# Patient Record
Sex: FEMALE | Race: WHITE | NOT HISPANIC OR LATINO
[De-identification: names, ages, dates, MRNs, and addresses within clinical notes are randomized per-mention and may not be internally consistent; named-entity substitution may affect disease eponyms.]

---

## 2018-11-15 ENCOUNTER — APPOINTMENT (OUTPATIENT)
Dept: OTOLARYNGOLOGY | Facility: CLINIC | Age: 78
End: 2018-11-15
Payer: MEDICARE

## 2018-11-15 VITALS
HEART RATE: 89 BPM | BODY MASS INDEX: 32.39 KG/M2 | SYSTOLIC BLOOD PRESSURE: 151 MMHG | WEIGHT: 165 LBS | HEIGHT: 60 IN | DIASTOLIC BLOOD PRESSURE: 85 MMHG

## 2018-11-15 DIAGNOSIS — I10 ESSENTIAL (PRIMARY) HYPERTENSION: ICD-10-CM

## 2018-11-15 DIAGNOSIS — G50.1 ATYPICAL FACIAL PAIN: ICD-10-CM

## 2018-11-15 DIAGNOSIS — R09.82 POSTNASAL DRIP: ICD-10-CM

## 2018-11-15 DIAGNOSIS — Z78.9 OTHER SPECIFIED HEALTH STATUS: ICD-10-CM

## 2018-11-15 DIAGNOSIS — J32.2 CHRONIC ETHMOIDAL SINUSITIS: ICD-10-CM

## 2018-11-15 PROCEDURE — 31231 NASAL ENDOSCOPY DX: CPT

## 2018-11-15 PROCEDURE — 99213 OFFICE O/P EST LOW 20 MIN: CPT | Mod: 25

## 2018-11-15 RX ORDER — IBUPROFEN 200 MG/1
200 TABLET, COATED ORAL
Refills: 0 | Status: ACTIVE | COMMUNITY

## 2018-11-15 RX ORDER — ACETAMINOPHEN 500 MG/1
500 TABLET, COATED ORAL
Refills: 0 | Status: ACTIVE | COMMUNITY

## 2018-11-15 RX ORDER — ASPIRIN 81 MG
81 TABLET, DELAYED RELEASE (ENTERIC COATED) ORAL
Refills: 0 | Status: ACTIVE | COMMUNITY

## 2018-11-15 RX ORDER — AZELASTINE HYDROCHLORIDE 137 UG/1
0.1 SPRAY, METERED NASAL TWICE DAILY
Qty: 1 | Refills: 5 | Status: ACTIVE | COMMUNITY
Start: 2018-11-15 | End: 1900-01-01

## 2018-11-15 RX ORDER — PREDNISONE 10 MG/1
10 TABLET ORAL TWICE DAILY
Qty: 20 | Refills: 2 | Status: ACTIVE | COMMUNITY
Start: 2018-11-15 | End: 1900-01-01

## 2018-11-15 RX ORDER — ATORVASTATIN CALCIUM 10 MG/1
10 TABLET, FILM COATED ORAL
Refills: 0 | Status: ACTIVE | COMMUNITY

## 2018-11-15 RX ORDER — LOSARTAN POTASSIUM 50 MG/1
50 TABLET, FILM COATED ORAL
Refills: 0 | Status: ACTIVE | COMMUNITY

## 2018-11-15 RX ORDER — ATENOLOL 50 MG/1
50 TABLET ORAL
Refills: 0 | Status: ACTIVE | COMMUNITY

## 2019-02-19 ENCOUNTER — OUTPATIENT (OUTPATIENT)
Dept: OUTPATIENT SERVICES | Facility: HOSPITAL | Age: 79
LOS: 1 days | Discharge: HOME | End: 2019-02-19
Payer: MEDICARE

## 2019-02-19 VITALS
TEMPERATURE: 98 F | DIASTOLIC BLOOD PRESSURE: 85 MMHG | WEIGHT: 173.06 LBS | RESPIRATION RATE: 20 BRPM | SYSTOLIC BLOOD PRESSURE: 120 MMHG | HEIGHT: 60 IN | OXYGEN SATURATION: 99 % | HEART RATE: 81 BPM

## 2019-02-19 DIAGNOSIS — M19.011 PRIMARY OSTEOARTHRITIS, RIGHT SHOULDER: ICD-10-CM

## 2019-02-19 DIAGNOSIS — Z96.659 PRESENCE OF UNSPECIFIED ARTIFICIAL KNEE JOINT: Chronic | ICD-10-CM

## 2019-02-19 DIAGNOSIS — Z98.890 OTHER SPECIFIED POSTPROCEDURAL STATES: Chronic | ICD-10-CM

## 2019-02-19 DIAGNOSIS — Z98.49 CATARACT EXTRACTION STATUS, UNSPECIFIED EYE: Chronic | ICD-10-CM

## 2019-02-19 DIAGNOSIS — Z90.49 ACQUIRED ABSENCE OF OTHER SPECIFIED PARTS OF DIGESTIVE TRACT: Chronic | ICD-10-CM

## 2019-02-19 DIAGNOSIS — Z96.651 PRESENCE OF RIGHT ARTIFICIAL KNEE JOINT: Chronic | ICD-10-CM

## 2019-02-19 DIAGNOSIS — Z29.9 ENCOUNTER FOR PROPHYLACTIC MEASURES, UNSPECIFIED: ICD-10-CM

## 2019-02-19 LAB
ANION GAP SERPL CALC-SCNC: 10 MMOL/L — SIGNIFICANT CHANGE UP (ref 5–17)
APPEARANCE UR: CLEAR — SIGNIFICANT CHANGE UP
BACTERIA # UR AUTO: ABNORMAL
BASOPHILS # BLD AUTO: 0.02 K/UL — SIGNIFICANT CHANGE UP (ref 0–0.2)
BASOPHILS NFR BLD AUTO: 0.2 % — SIGNIFICANT CHANGE UP (ref 0–2)
BILIRUB UR-MCNC: NEGATIVE — SIGNIFICANT CHANGE UP
BLD GP AB SCN SERPL QL: SIGNIFICANT CHANGE UP
BUN SERPL-MCNC: 34 MG/DL — HIGH (ref 7–23)
CALCIUM SERPL-MCNC: 8.9 MG/DL — SIGNIFICANT CHANGE UP (ref 8.5–10.1)
CHLORIDE SERPL-SCNC: 111 MMOL/L — HIGH (ref 96–108)
CO2 SERPL-SCNC: 23 MMOL/L — SIGNIFICANT CHANGE UP (ref 22–31)
COLOR SPEC: YELLOW — SIGNIFICANT CHANGE UP
CREAT SERPL-MCNC: 0.73 MG/DL — SIGNIFICANT CHANGE UP (ref 0.5–1.3)
DIFF PNL FLD: NEGATIVE — SIGNIFICANT CHANGE UP
EOSINOPHIL # BLD AUTO: 0.17 K/UL — SIGNIFICANT CHANGE UP (ref 0–0.5)
EOSINOPHIL NFR BLD AUTO: 1.5 % — SIGNIFICANT CHANGE UP (ref 0–6)
EPI CELLS # UR: SIGNIFICANT CHANGE UP
GLUCOSE SERPL-MCNC: 106 MG/DL — HIGH (ref 70–99)
GLUCOSE UR QL: NEGATIVE MG/DL — SIGNIFICANT CHANGE UP
HCT VFR BLD CALC: 36.6 % — SIGNIFICANT CHANGE UP (ref 34.5–45)
HGB BLD-MCNC: 11.1 G/DL — LOW (ref 11.5–15.5)
HYALINE CASTS # UR AUTO: ABNORMAL /LPF
IMM GRANULOCYTES NFR BLD AUTO: 0.4 % — SIGNIFICANT CHANGE UP (ref 0–1.5)
KETONES UR-MCNC: NEGATIVE — SIGNIFICANT CHANGE UP
LEUKOCYTE ESTERASE UR-ACNC: ABNORMAL
LYMPHOCYTES # BLD AUTO: 2.62 K/UL — SIGNIFICANT CHANGE UP (ref 1–3.3)
LYMPHOCYTES # BLD AUTO: 23.4 % — SIGNIFICANT CHANGE UP (ref 13–44)
MCHC RBC-ENTMCNC: 22.7 PG — LOW (ref 27–34)
MCHC RBC-ENTMCNC: 30.3 GM/DL — LOW (ref 32–36)
MCV RBC AUTO: 75 FL — LOW (ref 80–100)
MONOCYTES # BLD AUTO: 0.71 K/UL — SIGNIFICANT CHANGE UP (ref 0–0.9)
MONOCYTES NFR BLD AUTO: 6.3 % — SIGNIFICANT CHANGE UP (ref 2–14)
NEUTROPHILS # BLD AUTO: 7.66 K/UL — HIGH (ref 1.8–7.4)
NEUTROPHILS NFR BLD AUTO: 68.2 % — SIGNIFICANT CHANGE UP (ref 43–77)
NITRITE UR-MCNC: NEGATIVE — SIGNIFICANT CHANGE UP
NRBC # BLD: 0 /100 WBCS — SIGNIFICANT CHANGE UP (ref 0–0)
PH UR: 5 — SIGNIFICANT CHANGE UP (ref 5–8)
PLATELET # BLD AUTO: 489 K/UL — HIGH (ref 150–400)
POTASSIUM SERPL-MCNC: 4.1 MMOL/L — SIGNIFICANT CHANGE UP (ref 3.5–5.3)
POTASSIUM SERPL-SCNC: 4.1 MMOL/L — SIGNIFICANT CHANGE UP (ref 3.5–5.3)
PROT UR-MCNC: 15 MG/DL
RBC # BLD: 4.88 M/UL — SIGNIFICANT CHANGE UP (ref 3.8–5.2)
RBC # FLD: 18.4 % — HIGH (ref 10.3–14.5)
RBC CASTS # UR COMP ASSIST: SIGNIFICANT CHANGE UP /HPF (ref 0–4)
SODIUM SERPL-SCNC: 144 MMOL/L — SIGNIFICANT CHANGE UP (ref 135–145)
SP GR SPEC: 1.02 — SIGNIFICANT CHANGE UP (ref 1.01–1.02)
TYPE + AB SCN PNL BLD: SIGNIFICANT CHANGE UP
UROBILINOGEN FLD QL: NEGATIVE MG/DL — SIGNIFICANT CHANGE UP
WBC # BLD: 11.22 K/UL — HIGH (ref 3.8–10.5)
WBC # FLD AUTO: 11.22 K/UL — HIGH (ref 3.8–10.5)
WBC UR QL: ABNORMAL

## 2019-02-19 PROCEDURE — 93010 ELECTROCARDIOGRAM REPORT: CPT

## 2019-02-19 PROCEDURE — 71046 X-RAY EXAM CHEST 2 VIEWS: CPT | Mod: 26

## 2019-02-19 RX ORDER — LOSARTAN POTASSIUM 100 MG/1
1 TABLET, FILM COATED ORAL
Qty: 0 | Refills: 0 | COMMUNITY

## 2019-02-19 RX ORDER — ATENOLOL 25 MG/1
1 TABLET ORAL
Qty: 0 | Refills: 0 | COMMUNITY

## 2019-02-19 NOTE — H&P PST ADULT - PMH
HTN (hypertension)    Hypercholesterolemia    OA (osteoarthritis) of shoulder    Obesity    Spondylolisthesis of lumbar region

## 2019-02-19 NOTE — H&P PST ADULT - ASSESSMENT
78 y/o female with right shoulder osteoarthritis. Complain of chronic right shoulder pain. Scheduled for Reverse right total shoulder replacement.   Plan  1. Stop all NSAIDS, herbal supplements and vitamins for 7 days.  2. NPO at midnight.  3. Use EZ sponges as directed  4. Use mupirocin as directed  5. Labs, EKG, CXR as per surgeon  6. PMD/cardiologist visit for optimization prior to surgery as per surgeon.    CAPRINI SCORE [CLOT]  AGE RELATED RISK FACTORS                                                       MOBILITY RELATED FACTORS  [ ] Age 41-60 years                                            (1 Point)                  [ ] Bed rest                                                        (1 Point)  [ ] Age: 61-74 years                                           (2 Points)                 [ ] Plaster cast                                                   (2 Points)  [x ] Age= 75 years                                              (3 Points)                 [ ] Bed bound for more than 72 hours                 (2 Points)    DISEASE RELATED RISK FACTORS                                               GENDER SPECIFIC FACTORS  [ ] Edema in the lower extremities                       (1 Point)                  [ ] Pregnancy                                                     (1 Point)  [ ] Varicose veins                                               (1 Point)                  [ ] Post-partum < 6 weeks                                   (1 Point)             [x ] BMI > 25 Kg/m2                                            (1 Point)                  [ ] Hormonal therapy  or oral contraception          (1 Point)                 [ ] Sepsis (in the previous month)                        (1 Point)                  [ ] History of pregnancy complications                 (1 point)  [ ] Pneumonia or serious lung disease                                               [ ] Unexplained or recurrent                     (1 Point)           (in the previous month)                               (1 Point)  [ ] Abnormal pulmonary function test                     (1 Point)                 SURGERY RELATED RISK FACTORS  [ ] Acute myocardial infarction                              (1 Point)                 [ ]  Section                                             (1 Point)  [ ] Congestive heart failure (in the previous month)  (1 Point)               [ ] Minor surgery                                                  (1 Point)   [ ] Inflammatory bowel disease                             (1 Point)                 [ ] Arthroscopic surgery                                        (2 Points)  [ ] Central venous access                                      (2 Points)                [ ] General surgery lasting more than 45 minutes   (2 Points)       [ ] Stroke (in the previous month)                          (5 Points)               [ x ] Elective arthroplasty                                         (5 Points)     ()  malignancy                                                             (2 points )                                                                                                                                      HEMATOLOGY RELATED FACTORS                                                 TRAUMA RELATED RISK FACTORS  [ ] Prior episodes of VTE                                     (3 Points)                 [ ] Fracture of the hip, pelvis, or leg                       (5 Points)  [ ] Positive family history for VTE                         (3 Points)                 [ ] Acute spinal cord injury (in the previous month)  (5 Points)  [ ] Prothrombin 65075 A                                     (3 Points)                 [ ] Paralysis  (less than 1 month)                             (5 Points)  [ ] Factor V Leiden                                             (3 Points)                  [ ] Multiple Trauma within 1 month                        (5 Points)  [ ] Lupus anticoagulants                                     (3 Points)                                                           [ ] Anticardiolipin antibodies                               (3 Points)                                                       [ ] High homocysteine in the blood                      (3 Points)                                             [ ] Other congenital or acquired thrombophilia      (3 Points)                                                [ ] Heparin induced thrombocytopenia                  (3 Points)    (  ) Malignancy                                        Total Score [   9       ]

## 2019-02-19 NOTE — H&P PST ADULT - PSH
H/O total knee replacement, right  2015  History of cataract surgery  b/l  History of hand surgery  left hand  History of revision of total knee arthroplasty  right knee 2015  S/P appendectomy  2005

## 2019-02-19 NOTE — H&P PST ADULT - FAMILY HISTORY
Father  Still living? Unknown  Family history of heart disease, Age at diagnosis: Age Unknown     Mother  Still living? Unknown  Family history of heart disease, Age at diagnosis: Age Unknown  Family history of cerebrovascular accident (CVA) in mother, Age at diagnosis: Age Unknown

## 2019-02-19 NOTE — H&P PST ADULT - HISTORY OF PRESENT ILLNESS
78 y/o female with right shoulder osteoarthritis. Complain of chronic right shoulder pain. Takes NSAID with mild pain relief. Scheduled for Reverse right total shoulder replacement.

## 2019-02-19 NOTE — H&P PST ADULT - NSANTHOSAYNRD_GEN_A_CORE
No. ROSSANA screening performed.  STOP BANG Legend: 0-2 = LOW Risk; 3-4 = INTERMEDIATE Risk; 5-8 = HIGH Risk

## 2019-02-20 LAB
MRSA PCR RESULT.: SIGNIFICANT CHANGE UP
S AUREUS DNA NOSE QL NAA+PROBE: DETECTED

## 2019-03-01 ENCOUNTER — TRANSCRIPTION ENCOUNTER (OUTPATIENT)
Age: 79
End: 2019-03-01

## 2019-03-01 ENCOUNTER — OUTPATIENT (OUTPATIENT)
Dept: INPATIENT UNIT | Facility: HOSPITAL | Age: 79
LOS: 1 days | Discharge: HOME | End: 2019-03-01
Payer: MEDICARE

## 2019-03-01 ENCOUNTER — RESULT REVIEW (OUTPATIENT)
Age: 79
End: 2019-03-01

## 2019-03-01 ENCOUNTER — INPATIENT (INPATIENT)
Facility: HOSPITAL | Age: 79
LOS: 0 days | Discharge: HOME | End: 2019-03-02
Attending: ORTHOPAEDIC SURGERY | Admitting: ORTHOPAEDIC SURGERY

## 2019-03-01 VITALS
TEMPERATURE: 98 F | WEIGHT: 164.91 LBS | OXYGEN SATURATION: 98 % | HEIGHT: 60 IN | HEART RATE: 99 BPM | DIASTOLIC BLOOD PRESSURE: 61 MMHG | SYSTOLIC BLOOD PRESSURE: 136 MMHG | RESPIRATION RATE: 16 BRPM

## 2019-03-01 DIAGNOSIS — Z82.3 FAMILY HISTORY OF STROKE: ICD-10-CM

## 2019-03-01 DIAGNOSIS — E78.00 PURE HYPERCHOLESTEROLEMIA, UNSPECIFIED: ICD-10-CM

## 2019-03-01 DIAGNOSIS — Z82.49 FAMILY HISTORY OF ISCHEMIC HEART DISEASE AND OTHER DISEASES OF THE CIRCULATORY SYSTEM: ICD-10-CM

## 2019-03-01 DIAGNOSIS — Z98.49 CATARACT EXTRACTION STATUS, UNSPECIFIED EYE: Chronic | ICD-10-CM

## 2019-03-01 DIAGNOSIS — M43.16 SPONDYLOLISTHESIS, LUMBAR REGION: ICD-10-CM

## 2019-03-01 DIAGNOSIS — Z79.82 LONG TERM (CURRENT) USE OF ASPIRIN: ICD-10-CM

## 2019-03-01 DIAGNOSIS — I10 ESSENTIAL (PRIMARY) HYPERTENSION: ICD-10-CM

## 2019-03-01 DIAGNOSIS — Z98.41 CATARACT EXTRACTION STATUS, RIGHT EYE: ICD-10-CM

## 2019-03-01 DIAGNOSIS — Z90.49 ACQUIRED ABSENCE OF OTHER SPECIFIED PARTS OF DIGESTIVE TRACT: Chronic | ICD-10-CM

## 2019-03-01 DIAGNOSIS — E66.9 OBESITY, UNSPECIFIED: ICD-10-CM

## 2019-03-01 DIAGNOSIS — Z96.651 PRESENCE OF RIGHT ARTIFICIAL KNEE JOINT: ICD-10-CM

## 2019-03-01 DIAGNOSIS — Z98.890 OTHER SPECIFIED POSTPROCEDURAL STATES: Chronic | ICD-10-CM

## 2019-03-01 DIAGNOSIS — M19.011 PRIMARY OSTEOARTHRITIS, RIGHT SHOULDER: ICD-10-CM

## 2019-03-01 DIAGNOSIS — Z96.651 PRESENCE OF RIGHT ARTIFICIAL KNEE JOINT: Chronic | ICD-10-CM

## 2019-03-01 DIAGNOSIS — Z96.659 PRESENCE OF UNSPECIFIED ARTIFICIAL KNEE JOINT: Chronic | ICD-10-CM

## 2019-03-01 DIAGNOSIS — Z98.42 CATARACT EXTRACTION STATUS, LEFT EYE: ICD-10-CM

## 2019-03-01 DIAGNOSIS — M65.811 OTHER SYNOVITIS AND TENOSYNOVITIS, RIGHT SHOULDER: ICD-10-CM

## 2019-03-01 LAB
ANION GAP SERPL CALC-SCNC: 8 MMOL/L — SIGNIFICANT CHANGE UP (ref 5–17)
BUN SERPL-MCNC: 18 MG/DL — SIGNIFICANT CHANGE UP (ref 7–23)
CALCIUM SERPL-MCNC: 8.1 MG/DL — LOW (ref 8.5–10.1)
CHLORIDE SERPL-SCNC: 109 MMOL/L — HIGH (ref 96–108)
CO2 SERPL-SCNC: 24 MMOL/L — SIGNIFICANT CHANGE UP (ref 22–31)
CREAT SERPL-MCNC: 0.59 MG/DL — SIGNIFICANT CHANGE UP (ref 0.5–1.3)
GLUCOSE SERPL-MCNC: 115 MG/DL — HIGH (ref 70–99)
HCT VFR BLD CALC: 28.8 % — LOW (ref 34.5–45)
HGB BLD-MCNC: 9 G/DL — LOW (ref 11.5–15.5)
INR BLD: 1.08 RATIO — SIGNIFICANT CHANGE UP (ref 0.88–1.16)
MCHC RBC-ENTMCNC: 23.4 PG — LOW (ref 27–34)
MCHC RBC-ENTMCNC: 31.3 GM/DL — LOW (ref 32–36)
MCV RBC AUTO: 75 FL — LOW (ref 80–100)
NRBC # BLD: 0 /100 WBCS — SIGNIFICANT CHANGE UP (ref 0–0)
PLATELET # BLD AUTO: 427 K/UL — HIGH (ref 150–400)
POTASSIUM SERPL-MCNC: 4 MMOL/L — SIGNIFICANT CHANGE UP (ref 3.5–5.3)
POTASSIUM SERPL-SCNC: 4 MMOL/L — SIGNIFICANT CHANGE UP (ref 3.5–5.3)
PROTHROM AB SERPL-ACNC: 12 SEC — SIGNIFICANT CHANGE UP (ref 10–12.9)
RBC # BLD: 3.84 M/UL — SIGNIFICANT CHANGE UP (ref 3.8–5.2)
RBC # FLD: 18.2 % — HIGH (ref 10.3–14.5)
SODIUM SERPL-SCNC: 141 MMOL/L — SIGNIFICANT CHANGE UP (ref 135–145)
WBC # BLD: 14.82 K/UL — HIGH (ref 3.8–10.5)
WBC # FLD AUTO: 14.82 K/UL — HIGH (ref 3.8–10.5)

## 2019-03-01 PROCEDURE — 23472 RECONSTRUCT SHOULDER JOINT: CPT | Mod: AS,RT

## 2019-03-01 PROCEDURE — 88304 TISSUE EXAM BY PATHOLOGIST: CPT | Mod: 26

## 2019-03-01 PROCEDURE — 99223 1ST HOSP IP/OBS HIGH 75: CPT

## 2019-03-01 PROCEDURE — 73030 X-RAY EXAM OF SHOULDER: CPT | Mod: 26,RT

## 2019-03-01 RX ORDER — ASPIRIN/CALCIUM CARB/MAGNESIUM 324 MG
1 TABLET ORAL
Qty: 0 | Refills: 0 | COMMUNITY

## 2019-03-01 RX ORDER — KETOROLAC TROMETHAMINE 30 MG/ML
15 SYRINGE (ML) INJECTION ONCE
Qty: 0 | Refills: 0 | Status: DISCONTINUED | OUTPATIENT
Start: 2019-03-01 | End: 2019-03-01

## 2019-03-01 RX ORDER — TRAMADOL HYDROCHLORIDE 50 MG/1
50 TABLET ORAL EVERY 4 HOURS
Qty: 0 | Refills: 0 | Status: DISCONTINUED | OUTPATIENT
Start: 2019-03-01 | End: 2019-03-02

## 2019-03-01 RX ORDER — ACETAMINOPHEN 500 MG
1000 TABLET ORAL ONCE
Qty: 0 | Refills: 0 | Status: COMPLETED | OUTPATIENT
Start: 2019-03-01 | End: 2019-03-01

## 2019-03-01 RX ORDER — ATORVASTATIN CALCIUM 80 MG/1
10 TABLET, FILM COATED ORAL AT BEDTIME
Qty: 0 | Refills: 0 | Status: DISCONTINUED | OUTPATIENT
Start: 2019-03-01 | End: 2019-03-02

## 2019-03-01 RX ORDER — OXYCODONE HYDROCHLORIDE 5 MG/1
5 TABLET ORAL ONCE
Qty: 0 | Refills: 0 | Status: DISCONTINUED | OUTPATIENT
Start: 2019-03-01 | End: 2019-03-01

## 2019-03-01 RX ORDER — OXYCODONE HYDROCHLORIDE 5 MG/1
10 TABLET ORAL EVERY 6 HOURS
Qty: 0 | Refills: 0 | Status: DISCONTINUED | OUTPATIENT
Start: 2019-03-01 | End: 2019-03-01

## 2019-03-01 RX ORDER — ONDANSETRON 8 MG/1
4 TABLET, FILM COATED ORAL EVERY 6 HOURS
Qty: 0 | Refills: 0 | Status: DISCONTINUED | OUTPATIENT
Start: 2019-03-01 | End: 2019-03-02

## 2019-03-01 RX ORDER — ACETAMINOPHEN 500 MG
650 TABLET ORAL EVERY 6 HOURS
Qty: 0 | Refills: 0 | Status: DISCONTINUED | OUTPATIENT
Start: 2019-03-01 | End: 2019-03-02

## 2019-03-01 RX ORDER — SODIUM CHLORIDE 9 MG/ML
1000 INJECTION, SOLUTION INTRAVENOUS
Qty: 0 | Refills: 0 | Status: DISCONTINUED | OUTPATIENT
Start: 2019-03-01 | End: 2019-03-01

## 2019-03-01 RX ORDER — ASPIRIN/CALCIUM CARB/MAGNESIUM 324 MG
325 TABLET ORAL DAILY
Qty: 0 | Refills: 0 | Status: DISCONTINUED | OUTPATIENT
Start: 2019-03-01 | End: 2019-03-02

## 2019-03-01 RX ORDER — OXYCODONE HYDROCHLORIDE 5 MG/1
5 TABLET ORAL
Qty: 0 | Refills: 0 | Status: DISCONTINUED | OUTPATIENT
Start: 2019-03-01 | End: 2019-03-01

## 2019-03-01 RX ORDER — ATENOLOL 25 MG/1
50 TABLET ORAL AT BEDTIME
Qty: 0 | Refills: 0 | Status: DISCONTINUED | OUTPATIENT
Start: 2019-03-01 | End: 2019-03-02

## 2019-03-01 RX ORDER — DOCUSATE SODIUM 100 MG
1 CAPSULE ORAL
Qty: 14 | Refills: 0
Start: 2019-03-01 | End: 2019-03-07

## 2019-03-01 RX ORDER — FENTANYL CITRATE 50 UG/ML
25 INJECTION INTRAVENOUS
Qty: 0 | Refills: 0 | Status: DISCONTINUED | OUTPATIENT
Start: 2019-03-01 | End: 2019-03-01

## 2019-03-01 RX ORDER — MUPIROCIN 20 MG/G
1 OINTMENT TOPICAL
Qty: 0 | Refills: 0 | COMMUNITY

## 2019-03-01 RX ORDER — TRAMADOL HYDROCHLORIDE 50 MG/1
25 TABLET ORAL EVERY 4 HOURS
Qty: 0 | Refills: 0 | Status: DISCONTINUED | OUTPATIENT
Start: 2019-03-01 | End: 2019-03-02

## 2019-03-01 RX ORDER — PANTOPRAZOLE SODIUM 20 MG/1
40 TABLET, DELAYED RELEASE ORAL ONCE
Qty: 0 | Refills: 0 | Status: COMPLETED | OUTPATIENT
Start: 2019-03-01 | End: 2019-03-01

## 2019-03-01 RX ORDER — CEFAZOLIN SODIUM 1 G
2000 VIAL (EA) INJECTION EVERY 6 HOURS
Qty: 0 | Refills: 0 | Status: COMPLETED | OUTPATIENT
Start: 2019-03-01 | End: 2019-03-01

## 2019-03-01 RX ORDER — LOSARTAN POTASSIUM 100 MG/1
50 TABLET, FILM COATED ORAL DAILY
Qty: 0 | Refills: 0 | Status: DISCONTINUED | OUTPATIENT
Start: 2019-03-01 | End: 2019-03-02

## 2019-03-01 RX ORDER — ONDANSETRON 8 MG/1
4 TABLET, FILM COATED ORAL ONCE
Qty: 0 | Refills: 0 | Status: DISCONTINUED | OUTPATIENT
Start: 2019-03-01 | End: 2019-03-01

## 2019-03-01 RX ORDER — ASPIRIN/CALCIUM CARB/MAGNESIUM 324 MG
1 TABLET ORAL
Qty: 14 | Refills: 0 | OUTPATIENT
Start: 2019-03-01 | End: 2019-03-14

## 2019-03-01 RX ORDER — PANTOPRAZOLE SODIUM 20 MG/1
1 TABLET, DELAYED RELEASE ORAL
Qty: 30 | Refills: 0 | OUTPATIENT
Start: 2019-03-01 | End: 2019-03-30

## 2019-03-01 RX ADMIN — ATORVASTATIN CALCIUM 10 MILLIGRAM(S): 80 TABLET, FILM COATED ORAL at 21:06

## 2019-03-01 RX ADMIN — PANTOPRAZOLE SODIUM 40 MILLIGRAM(S): 20 TABLET, DELAYED RELEASE ORAL at 06:35

## 2019-03-01 RX ADMIN — Medication 100 MILLIGRAM(S): at 16:17

## 2019-03-01 RX ADMIN — Medication 100 MILLIGRAM(S): at 21:06

## 2019-03-01 RX ADMIN — Medication 650 MILLIGRAM(S): at 16:20

## 2019-03-01 RX ADMIN — Medication 15 MILLIGRAM(S): at 22:42

## 2019-03-01 RX ADMIN — Medication 650 MILLIGRAM(S): at 15:22

## 2019-03-01 RX ADMIN — Medication 400 MILLIGRAM(S): at 10:34

## 2019-03-01 RX ADMIN — ATENOLOL 50 MILLIGRAM(S): 25 TABLET ORAL at 21:07

## 2019-03-01 RX ADMIN — ONDANSETRON 4 MILLIGRAM(S): 8 TABLET, FILM COATED ORAL at 19:53

## 2019-03-01 RX ADMIN — TRAMADOL HYDROCHLORIDE 50 MILLIGRAM(S): 50 TABLET ORAL at 21:06

## 2019-03-01 RX ADMIN — Medication 1000 MILLIGRAM(S): at 11:35

## 2019-03-01 RX ADMIN — SODIUM CHLORIDE 75 MILLILITER(S): 9 INJECTION, SOLUTION INTRAVENOUS at 11:35

## 2019-03-01 RX ADMIN — TRAMADOL HYDROCHLORIDE 50 MILLIGRAM(S): 50 TABLET ORAL at 22:05

## 2019-03-01 RX ADMIN — Medication 15 MILLIGRAM(S): at 22:56

## 2019-03-01 NOTE — DISCHARGE NOTE ADULT - CARE PROVIDER_API CALL
Molina Loja)  Orthopaedic Surgery  [address] [place] [address] [place] [address]  Phone: [phone]  Fax: [phone]  Follow Up Time:

## 2019-03-01 NOTE — PROGRESS NOTE ADULT - SUBJECTIVE AND OBJECTIVE BOX
__right____ Interscalene Nerve Block Note:  Time out performed, pt awake and alert, sterile prep with chlorhexidine and drape, ultrasound-guided, 21g 100mm parjunk needle, good visualization of needle and nerve at all times, 30cc of 0.5% Ropivacaine injected easily, no heme after aspirating every 5cc, no intraneural injection, no paresthesia.  Procedure well tolerated without complications.

## 2019-03-01 NOTE — CONSULT NOTE ADULT - ASSESSMENT
A:   80 yo female S/P  right TSR for OA, with mod thrombosis risk due to age and surgery      P:Discussed the risks vs benefits of full dose aspirin therapy with patient. Agrees with treatment and understands the necessity of therapy.    Plan:    Enteric coated ASA 325mg PO daily x 14 days    Protonix 40mg PO daily while on Aspirin    Daily CBC/BMP    Venodynes    Enc ambulation    Thank you for this consult, will sign off, please reconsult if needed

## 2019-03-01 NOTE — CONSULT NOTE ADULT - SUBJECTIVE AND OBJECTIVE BOX
PCP- DR Pipe Silver    CC- s/p RT TSR    HPI:  78yo/F with PMH HTN, hyperlipidemia, OA with prior RT TKR presented for elective RT TSR. Patient had pain in her RT shoulder affecting her daily activities, she failed outpatient medical treatment and required surgery. Medical consult called for postop medical management    PMH- as above  PSH- RT TKR in 2014, cataracts, LT hand surgery, appendectomy  Soc hx- denies smoking, alcohol-socially  Fam hx- f had heart condition, m had heart condition    3/1/19- c/o headache, otherwise feeling geed    Review of system- All 10 systems reviewed and is as per HPI otherwise negative.     T(C): 36.9 (03-01-19 @ 13:48), Max: 37 (03-01-19 @ 12:00)  HR: 91 (03-01-19 @ 13:48) (74 - 99)  BP: 119/58 (03-01-19 @ 13:48) (112/35 - 136/61)  RR: 16 (03-01-19 @ 13:48) (14 - 24)  SpO2: 97% (03-01-19 @ 13:48) (95% - 98%)  Wt(kg): --    LABS:                        9.0    14.82 )-----------( 427      ( 01 Mar 2019 11:04 )             28.8     141  |  109<H>  |  18  ----------------------------<  115<H>  4.0   |  24  |  0.59    Ca    8.1<L>      01 Mar 2019 11:04    PT/INR - ( 01 Mar 2019 06:23 )   PT: 12.0 sec;   INR: 1.08 ratio      RADIOLOGY & ADDITIONAL TESTS:    PHYSICAL EXAM:  GENERAL: NAD, well-groomed, well-developed  HEAD:  Atraumatic, Normocephalic  EYES: EOMI, PERRLA, conjunctiva and sclera clear  HEENT: Moist mucous membranes  NECK: Supple, No JVD  NERVOUS SYSTEM:  Alert & Oriented X3, Motor Strength 5/5 B/L upper and lower extremities; DTRs 2+ intact and symmetric  CHEST/LUNG: Clear to auscultation bilaterally; No rales, rhonchi, wheezing, or rubs  HEART: Regular rate and rhythm; No murmurs, rubs, or gallops  ABDOMEN: Soft, Nontender, Nondistended; Bowel sounds present  GENITOURINARY- Voiding, no palpable bladder  EXTREMITIES:  2+ Peripheral Pulses, No clubbing, cyanosis, or edema  MUSCULOSKELTAL- RT shoulder dressing dry, no underlying hematoma  SKIN-no rash, no lesion  CNS- alert, oriented X3, non focal     Daily Height in cm: 152.4 (01 Mar 2019 06:17)      MEDICATIONS  (STANDING):  aspirin enteric coated 325 milliGRAM(s) Oral daily  ATENolol  Tablet 50 milliGRAM(s) Oral at bedtime  atorvastatin Oral Tab/Cap - Peds 10 milliGRAM(s) Oral at bedtime  ceFAZolin   IVPB 2000 milliGRAM(s) IV Intermittent every 6 hours  losartan 50 milliGRAM(s) Oral daily    MEDICATIONS  (PRN):  acetaminophen   Tablet .. 650 milliGRAM(s) Oral every 6 hours PRN Temp greater or equal to 38C (100.4F), Mild Pain (1 - 3)  ondansetron Injectable 4 milliGRAM(s) IV Push every 6 hours PRN Nausea  traMADol 25 milliGRAM(s) Oral every 4 hours PRN Moderate Pain (4 - 6)  traMADol 50 milliGRAM(s) Oral every 4 hours PRN Severe Pain (7 - 10)    Assessment/Plan  #S/p RT TSR  Ortho eval appreciated  PT as tolerated  Pain meds adjusted- states she cant tolerate oxycodone, will give a trial of tramadol here  Bowel regimen  Incentive spirometry  AC by Aspirin daily    #HTN/hyperlipidemia- stable    #Dispo- thank you for consult, will follow with you. D/w pt and son at bedside, ortho team

## 2019-03-01 NOTE — DISCHARGE NOTE ADULT - MEDICATION SUMMARY - MEDICATIONS TO TAKE
I will START or STAY ON the medications listed below when I get home from the hospital:    oxyCODONE-acetaminophen 5 mg-325 mg oral tablet  -- 1 tab(s) by mouth every 6 hours, As Needed  -for severe pain MDD:6   -- Caution federal law prohibits the transfer of this drug to any person other  than the person for whom it was prescribed.  May cause drowsiness.  Alcohol may intensify this effect.  Use care when operating dangerous machinery.  This prescription cannot be refilled.  This product contains acetaminophen.  Do not use  with any other product containing acetaminophen to prevent possible liver damage.  Using more of this medication than prescribed may cause serious breathing problems.    -- Indication: For severe pain    losartan 50 mg oral tablet  -- 2 tab(s) by mouth once a day (at bedtime)  -- Indication: For home med    atorvastatin 10 mg oral tablet  -- 1 tab(s) by mouth once a day  -- Indication: For home med    atenolol 50 mg oral tablet  -- 1 tab(s) by mouth once a day (at bedtime)  -- Indication: For home med    Colace 100 mg oral capsule  -- 1 cap(s) by mouth 2 times a day MDD:2, while on narcotics  -- Medication should be taken with plenty of water.    -- Indication: For stool softener I will START or STAY ON the medications listed below when I get home from the hospital:    oxyCODONE-acetaminophen 5 mg-325 mg oral tablet  -- 1 tab(s) by mouth every 6 hours, As Needed  -for severe pain MDD:6   -- Caution federal law prohibits the transfer of this drug to any person other  than the person for whom it was prescribed.  May cause drowsiness.  Alcohol may intensify this effect.  Use care when operating dangerous machinery.  This prescription cannot be refilled.  This product contains acetaminophen.  Do not use  with any other product containing acetaminophen to prevent possible liver damage.  Using more of this medication than prescribed may cause serious breathing problems.    -- Indication: For severe pain    Aspirin Enteric Coated 325 mg oral delayed release tablet  -- 1 tab(s) by mouth once a day   -- Swallow whole.  Do not crush.  Take with food or milk.    -- Indication: For dvt prophylaxis. do not skip doses.    losartan 50 mg oral tablet  -- 2 tab(s) by mouth once a day (at bedtime)  -- Indication: For home med    atorvastatin 10 mg oral tablet  -- 1 tab(s) by mouth once a day  -- Indication: For home med    atenolol 50 mg oral tablet  -- 1 tab(s) by mouth once a day (at bedtime)  -- Indication: For home med    Colace 100 mg oral capsule  -- 1 cap(s) by mouth 2 times a day MDD:2, while on narcotics  -- Medication should be taken with plenty of water.    -- Indication: For stool softener I will START or STAY ON the medications listed below when I get home from the hospital:    Aspirin Enteric Coated 325 mg oral delayed release tablet  -- 1 tab(s) by mouth once a day   -- Swallow whole.  Do not crush.  Take with food or milk.    -- Indication: For dvt prophylaxis. do not skip doses.    traMADol 50 mg oral tablet  -- 1 tab(s) by mouth every 4 hours MDD:5  -- Caution federal law prohibits the transfer of this drug to any person other  than the person for whom it was prescribed.  May cause drowsiness.  Alcohol may intensify this effect.  Use care when operating dangerous machinery.  Obtain medical advice before taking any non-prescription drugs as some may affect the action of this medication.    -- Indication: For for pain    losartan 50 mg oral tablet  -- 2 tab(s) by mouth once a day (at bedtime)  -- Indication: For home med    atorvastatin 10 mg oral tablet  -- 1 tab(s) by mouth once a day  -- Indication: For home med    atenolol 50 mg oral tablet  -- 1 tab(s) by mouth once a day (at bedtime)  -- Indication: For home med    Colace 100 mg oral capsule  -- 1 cap(s) by mouth 2 times a day MDD:2, while on narcotics  -- Medication should be taken with plenty of water.    -- Indication: For stool softener

## 2019-03-01 NOTE — DISCHARGE NOTE ADULT - PATIENT PORTAL LINK FT
You can access the [facility], offered by [facility], by registering with the following website: [email or web address]

## 2019-03-01 NOTE — CONSULT NOTE ADULT - SUBJECTIVE AND OBJECTIVE BOX
HPI:      Patient is a 79y old  Female who presents with a chief complaint of inc right shoulder pain, h/o OA, s/p right total shoulder replacement (01 Mar 2019 09:49)      Consulted by Dr. Loja   for VTE prophylaxis, risk stratification, and anticoagulation management.    PAST MEDICAL & SURGICAL HISTORY:  Spondylolisthesis of lumbar region  OA (osteoarthritis) of shoulder  Hypercholesterolemia  Obesity  HTN (hypertension)  History of hand surgery: left hand  History of revision of total knee arthroplasty: right knee   H/O total knee replacement, right:   History of cataract surgery: b/l  S/P appendectomy:       Vital Signs Last 24 Hrs  T(C): 36.9 (19 @ 13:48), Max: 37 (19 @ 12:00)  T(F): 98.5 (19 @ 13:48), Max: 98.6 (19 @ 12:00)  HR: 91 (19 @ 13:48) (74 - 99)  BP: 119/58 (19 @ 13:48) (112/35 - 136/61)  BP(mean): --  RR: 16 (19 @ 13:48) (14 - 24)  SpO2: 97% (19 @ 13:48) (95% - 98%)    CrCl:    Caprini VTE Risk Score: CAPRINI SCORE [CLOT]    AGE RELATED RISK FACTORS                                                       MOBILITY RELATED FACTORS  [ ] Age 41-60 years                                            (1 Point)                  [ ] Bed rest                                                        (1 Point)  [ ] Age: 61-74 years                                           (2 Points)                 [ ] Plaster cast                                                   (2 Points)  [x ] Age= 75 years                                              (3 Points)                 [ ] Bed bound for more than 72 hours                 (2 Points)    DISEASE RELATED RISK FACTORS                                               GENDER SPECIFIC FACTORS  [ ] Edema in the lower extremities                       (1 Point)                  [ ] Pregnancy                                                     (1 Point)  [ ] Varicose veins                                               (1 Point)                  [ ] Post-partum < 6 weeks                                   (1 Point)             [ x] BMI > 25 Kg/m2                                            (1 Point)                  [ ] Hormonal therapy  or oral contraception          (1 Point)                 [ ] Sepsis (in the previous month)                        (1 Point)                  [ ] History of pregnancy complications                 (1 point)  [ ] Pneumonia or serious lung disease                                               [ ] Unexplained or recurrent                     (1 Point)           (in the previous month)                               (1 Point)  [ ] Abnormal pulmonary function test                     (1 Point)                 SURGERY RELATED RISK FACTORS  [ ] Acute myocardial infarction                              (1 Point)                 [ ]  Section                                             (1 Point)  [ ] Congestive heart failure (in the previous month)  (1 Point)               [ ] Minor surgery                                                  (1 Point)   [ ] Inflammatory bowel disease                             (1 Point)                 [ ] Arthroscopic surgery                                        (2 Points)  [ ] Central venous access                                      (2 Points)                [ ] General surgery lasting more than 45 minutes   (2 Points)       [ ] Stroke (in the previous month)                          (5 Points)               [ ] Elective arthroplasty                                         (5 Points)            [ ] H/O malignancy ( present or previous)            ( 2 points)                                                                                                                                   HEMATOLOGY RELATED FACTORS                                                 TRAUMA RELATED RISK FACTORS  [ ] Prior episodes of VTE                                     (3 Points)                 [ ] Fracture of the hip, pelvis, or leg                       (5 Points)  [ ] Positive family history for VTE                         (3 Points)                 [ ] Acute spinal cord injury (in the previous month)  (5 Points)  [ ] Prothrombin 40685 A                                     (3 Points)                 [ ] Paralysis  (less than 1 month)                             (5 Points)  [ ] Factor V Leiden                                             (3 Points)                  [ ] Multiple Trauma within 1 month                        (5 Points)  [ ] Lupus anticoagulants                                     (3 Points)                                                           [ ] Anticardiolipin antibodies                               (3 Points)                                                       [ ] High homocysteine in the blood                      (3 Points)                                             [ ] Other congenital or acquired thrombophilia      (3 Points)                                                [ ] Heparin induced thrombocytopenia                  (3 Points)                                          Total Score [  6  ]    IMPROVE Bleeding Risk Score  :    Falls Risk:   High ( x )  Mod (  )  Low (  )      FAMILY HISTORY:  Family history of cerebrovascular accident (CVA) in mother (Mother)  Family history of heart disease (Father, Mother)    Denies any personal or familial history of clotting or bleeding disorders.    Allergies    No Known Allergies    Intolerances        REVIEW OF SYSTEMS    (  )Fever	     (  )Constipation	(  )SOB				(  )Headache	(  )Dysuria  (  )Chills	     (  )Melena	(  )Dyspnea present on exertion	                    (  )Dizziness                    (  )Polyuria  (  )Nausea	     (  )Hematochezia	(  )Cough			                    (  )Syncope   	(  )Hematuria  (  )Vomiting    (  )Chest Pain	(  )Wheezing			(  )Weakness  (  )Diarrhea     (  )Palpitations	(  )Anorexia			( x )Myalgia    All  other review of systems negative: Yes          PHYSICAL EXAM:    Constitutional: Appears Well    Neurological: A& O x 3    Skin: Warm    Respiratory and Thorax: normal effort; Breath sounds: normal; No rales/wheezing/rhonchi  	  Cardiovascular: S1, S2, regular, NMBR	    Gastrointestinal: BS + x 4Q, nontender	    Genitourinary:  Bladder nondistended, nontender    Musculoskeletal:   General Right:   + muscle/joint tenderness,   normal tone, no joint swelling,   ROM: limited	    General Left:   no muscle/joint tenderness,   normal tone, no joint swelling,   ROM: full  Shoulder:  Rt: Drsing CDI; Sling/immob. noted; Cap refill good; Radial pulse +; Sensation intact                           Lower extrems:   Right: no calf tenderness              negative tru's sign               + pedal pulses    Left:   no calf tenderness              negative tru's sign               + pedal pulses                          9.0    14.82 )-----------( 427      ( 01 Mar 2019 11:04 )             28.8       03-    141  |  109<H>  |  18  ----------------------------<  115<H>  4.0   |  24  |  0.59    Ca    8.1<L>      01 Mar 2019 11:04        PT/INR - ( 01 Mar 2019 06:23 )   PT: 12.0 sec;   INR: 1.08 ratio         				    MEDICATIONS  (STANDING):  aspirin enteric coated 325 milliGRAM(s) Oral daily  ATENolol  Tablet 50 milliGRAM(s) Oral at bedtime  atorvastatin Oral Tab/Cap - Peds 10 milliGRAM(s) Oral at bedtime  ceFAZolin   IVPB 2000 milliGRAM(s) IV Intermittent every 6 hours  losartan 50 milliGRAM(s) Oral daily          DVT Prophylaxis:  LMWH                   (  )  Heparin SQ           (  )  Coumadin             (  )  Xarelto                  (  )  Eliquis                   (  )  Venodynes           ( x )  Ambulation          (  )  UFH                       (  )  Contraindicated  (  )  Aspirin  (X)

## 2019-03-01 NOTE — DISCHARGE NOTE ADULT - PLAN OF CARE
less pain and improved function Total Shoulder Discharge Instructions     1. Activity: No Aggressive ROM until cleared by Dr. Loja. Maintain sling at all times EXCEPT to straighten elbow a few times daily. Elevate hand and wiggle fingers. Do not start any outpatient PT until you see Dr. Loja in the office.    2. Call with fever over 101, wound redness, drainage.    3. Wound care: Do not remove or change dressing unless saturated.  IF so, apply dry gauze, tegaderm. Be careful not to removed mesh that is glued to the wound. There are no sutures or staples to remove.    4. Continue to apply ICE packs.    5. DVT PE Prophylaxis: ECASA 325 BID OR Lovenox for 2 weeks per AC Team. See med Rec.    6. Follow Up: Orthopedics, Dr. LOJA  10-14 days in office. Call to schedule. If going home, eRx sent to your pharmacy for . Total Shoulder Discharge Instructions     1. Activity: No Aggressive ROM until cleared by Dr. Loja. Maintain sling at all times EXCEPT to straighten elbow a few times daily. Elevate hand and wiggle fingers. Do not start any outpatient PT until you see Dr. Loja in the office.    2. Call with fever over 101, wound redness, drainage.    3. Wound care: Do not remove or change dressing unless saturated.  IF so, apply dry gauze, tegaderm. Be careful not to removed mesh that is glued to the wound. There are no sutures or staples to remove.    4. Continue to apply ICE packs.    5. DVT PE Prophylaxis: ECASA 325 BID for 2 weeks per AC Team. See med Rec.    6. Follow Up: Orthopedics, Dr. LOJA  10-14 days in office. Call to schedule. If going home, eRx sent to your pharmacy for .

## 2019-03-01 NOTE — BRIEF OPERATIVE NOTE - PROCEDURE
<<-----Click on this checkbox to enter Procedure Reverse total shoulder arthroplasty  03/01/2019  RIGHT  Active  [id number]

## 2019-03-01 NOTE — DISCHARGE NOTE ADULT - MEDICATION SUMMARY - MEDICATIONS TO STOP TAKING
I will STOP taking the medications listed below when I get home from the hospital:    aspirin 81 mg oral tablet  -- 1 tab(s) by mouth once a day  hold 7 days before surgery    Low Dose ASA 81 mg oral tablet  -- 1 tab(s) by mouth once a day

## 2019-03-01 NOTE — DISCHARGE NOTE ADULT - HOSPITAL COURSE
H&P:  Pt is a 79y Female  PAST MEDICAL & SURGICAL HISTORY:  Spondylolisthesis of lumbar region  OA (osteoarthritis) of shoulder  Hypercholesterolemia  Obesity  HTN (hypertension)  History of hand surgery: left hand  History of revision of total knee arthroplasty: right knee 2015  H/O total knee replacement, right: 2015  History of cataract surgery: b/l  S/P appendectomy: 2005       Now s/p (Reverese**) Total Shoulder Arthroplasty. Pt is afebrile with stable vital signs. Pain is controlled. Alert and Oriented. Exam reveals intact AIN/PIN, wrist flexion and wrist extension, 2+Radial Pulse. Dressing is clean and dry with telfa/tegaderm on.    Hospital Course:  Patient presented to [facility] medically cleared for Shoulder Replacement Surgery, having failed outpatient non-operative conservative management. Prophylactic antibiotics were started before the procedure and continued for 24 hours. They were admitted after surgery to the orthopedic floor.   There were no complications during the hospital stay.     Routine consults were obtained from the Anticoagulation Team for DVT/PE prophylaxis, from Physical Therapy for twice daily PT ROM limited to full forward elevation, Internal rotation to Chest, external rotation to neutral, no extension. Consult to Hospitalist for Medical Co-management. Patient was placed on EC Aspirin 325 BID for anticoagulation.  Pertinent home medications were continued.  Daily labs were followed.      On POD 0 there were no overnight events and the pt was OOB with PT. POD 1, PT was continued, and on POD 1 or 2 pt is ready today for DC to home or to Rehab. The orthopedic Attending is aware and agrees. H&P:  Pt is a 79y Female  PAST MEDICAL & SURGICAL HISTORY:  Spondylolisthesis of lumbar region  OA (osteoarthritis) of shoulder  Hypercholesterolemia  Obesity  HTN (hypertension)  History of hand surgery: left hand  History of revision of total knee arthroplasty: right knee 2015  H/O total knee replacement, right: 2015  History of cataract surgery: b/l  S/P appendectomy: 2005     Vital Signs Last 24 Hrs  T(C): 36.9 (02 Mar 2019 05:45), Max: 37 (01 Mar 2019 12:00)  T(F): 98.5 (02 Mar 2019 05:45), Max: 98.6 (01 Mar 2019 12:00)  HR: 110 (02 Mar 2019 05:45) (77 - 110)  BP: 90/41 (02 Mar 2019 05:45) (90/41 - 135/68)  BP(mean): --  RR: 16 (02 Mar 2019 05:45) (14 - 19)  SpO2: 95% (02 Mar 2019 05:45) (94% - 98%)                          9.1    11.05 )-----------( 439      ( 02 Mar 2019 06:08 )             29.2   03-02    142  |  110<H>  |  25<H>  ----------------------------<  117<H>  4.0   |  25  |  0.78    Ca    8.2<L>      02 Mar 2019 06:08    Now s/p Reverse Total Shoulder Arthroplasty. Pt is afebrile with stable vital signs. Pain is controlled. Alert and Oriented. Exam reveals intact AIN/PIN, wrist flexion and wrist extension, 2+Radial Pulse. Dressing is clean and dry with prineo dressing.    Hospital Course:  Patient presented to [facility] medically cleared for Shoulder Replacement Surgery, having failed outpatient non-operative conservative management. Prophylactic antibiotics were started before the procedure and continued for 24 hours. They were admitted after surgery to the orthopedic floor.   There were no complications during the hospital stay.     Routine consults were obtained from the Anticoagulation Team for DVT/PE prophylaxis, from Physical Therapy for twice daily PT ROM limited to full forward elevation, Internal rotation to Chest, external rotation to neutral, no extension. Consult to Hospitalist for Medical Co-management. Patient was placed on EC Aspirin 325 BID for anticoagulation.  Pertinent home medications were continued.  Daily labs were followed.      On POD 0 there were no overnight events and the pt was OOB with PT. POD 1, PT was continued, and on POD 1 pt is ready today for DC to home. The orthopedic Attending is aware and agrees.

## 2019-03-01 NOTE — DISCHARGE NOTE ADULT - CARE PLAN
Principal Discharge DX:	Primary osteoarthritis of right shoulder  Goal:	less pain and improved function  Assessment and plan of treatment:	Total Shoulder Discharge Instructions     1. Activity: No Aggressive ROM until cleared by Dr. Loja. Maintain sling at all times EXCEPT to straighten elbow a few times daily. Elevate hand and wiggle fingers. Do not start any outpatient PT until you see Dr. Loja in the office.    2. Call with fever over 101, wound redness, drainage.    3. Wound care: Do not remove or change dressing unless saturated.  IF so, apply dry gauze, tegaderm. Be careful not to removed mesh that is glued to the wound. There are no sutures or staples to remove.    4. Continue to apply ICE packs.    5. DVT PE Prophylaxis: ECASA 325 BID OR Lovenox for 2 weeks per AC Team. See med Rec.    6. Follow Up: Orthopedics, Dr. LOJA  10-14 days in office. Call to schedule. If going home, eRx sent to your pharmacy for . Principal Discharge DX:	Primary osteoarthritis of right shoulder  Goal:	less pain and improved function  Assessment and plan of treatment:	Total Shoulder Discharge Instructions     1. Activity: No Aggressive ROM until cleared by Dr. Loja. Maintain sling at all times EXCEPT to straighten elbow a few times daily. Elevate hand and wiggle fingers. Do not start any outpatient PT until you see Dr. Loja in the office.    2. Call with fever over 101, wound redness, drainage.    3. Wound care: Do not remove or change dressing unless saturated.  IF so, apply dry gauze, tegaderm. Be careful not to removed mesh that is glued to the wound. There are no sutures or staples to remove.    4. Continue to apply ICE packs.    5. DVT PE Prophylaxis: ECASA 325 BID for 2 weeks per AC Team. See med Rec.    6. Follow Up: Orthopedics, Dr. LOJA  10-14 days in office. Call to schedule. If going home, eRx sent to your pharmacy for .

## 2019-03-02 VITALS — DIASTOLIC BLOOD PRESSURE: 77 MMHG | SYSTOLIC BLOOD PRESSURE: 118 MMHG

## 2019-03-02 LAB
ANION GAP SERPL CALC-SCNC: 7 MMOL/L — SIGNIFICANT CHANGE UP (ref 5–17)
BUN SERPL-MCNC: 25 MG/DL — HIGH (ref 7–23)
CALCIUM SERPL-MCNC: 8.2 MG/DL — LOW (ref 8.5–10.1)
CHLORIDE SERPL-SCNC: 110 MMOL/L — HIGH (ref 96–108)
CO2 SERPL-SCNC: 25 MMOL/L — SIGNIFICANT CHANGE UP (ref 22–31)
CREAT SERPL-MCNC: 0.78 MG/DL — SIGNIFICANT CHANGE UP (ref 0.5–1.3)
GLUCOSE SERPL-MCNC: 117 MG/DL — HIGH (ref 70–99)
HCT VFR BLD CALC: 29.2 % — LOW (ref 34.5–45)
HGB BLD-MCNC: 9.1 G/DL — LOW (ref 11.5–15.5)
MCHC RBC-ENTMCNC: 23.5 PG — LOW (ref 27–34)
MCHC RBC-ENTMCNC: 31.2 GM/DL — LOW (ref 32–36)
MCV RBC AUTO: 75.3 FL — LOW (ref 80–100)
NRBC # BLD: 0 /100 WBCS — SIGNIFICANT CHANGE UP (ref 0–0)
PLATELET # BLD AUTO: 439 K/UL — HIGH (ref 150–400)
POTASSIUM SERPL-MCNC: 4 MMOL/L — SIGNIFICANT CHANGE UP (ref 3.5–5.3)
POTASSIUM SERPL-SCNC: 4 MMOL/L — SIGNIFICANT CHANGE UP (ref 3.5–5.3)
RBC # BLD: 3.88 M/UL — SIGNIFICANT CHANGE UP (ref 3.8–5.2)
RBC # FLD: 18.3 % — HIGH (ref 10.3–14.5)
SODIUM SERPL-SCNC: 142 MMOL/L — SIGNIFICANT CHANGE UP (ref 135–145)
WBC # BLD: 11.05 K/UL — HIGH (ref 3.8–10.5)
WBC # FLD AUTO: 11.05 K/UL — HIGH (ref 3.8–10.5)

## 2019-03-02 RX ORDER — TRAMADOL HYDROCHLORIDE 50 MG/1
1 TABLET ORAL
Qty: 42 | Refills: 0
Start: 2019-03-02 | End: 2019-03-08

## 2019-03-02 RX ORDER — ASPIRIN/CALCIUM CARB/MAGNESIUM 324 MG
1 TABLET ORAL
Qty: 14 | Refills: 0
Start: 2019-03-02 | End: 2019-03-15

## 2019-03-02 RX ADMIN — TRAMADOL HYDROCHLORIDE 50 MILLIGRAM(S): 50 TABLET ORAL at 08:24

## 2019-03-02 RX ADMIN — Medication 325 MILLIGRAM(S): at 11:22

## 2019-03-02 NOTE — PROGRESS NOTE ADULT - SUBJECTIVE AND OBJECTIVE BOX
Pt seen and examined at bedside. Pain well controlled. Denies N/V/CP/Dyspnea/Calf tenderness bilaterally. Eager to go home.       Vital Signs Last 24 Hrs  T(C): 36.9 (02 Mar 2019 05:45), Max: 37 (01 Mar 2019 12:00)  T(F): 98.5 (02 Mar 2019 05:45), Max: 98.6 (01 Mar 2019 12:00)  HR: 110 (02 Mar 2019 05:45) (74 - 110)  BP: 90/41 (02 Mar 2019 05:45) (90/41 - 136/40)  BP(mean): --  RR: 16 (02 Mar 2019 05:45) (14 - 24)  SpO2: 95% (02 Mar 2019 05:45) (94% - 98%)    PE RUE:  dressing overlying shoulder c/d/i  sling in place  b/l calf NTTP bilaterally  +ain/pin/r/u/m/mc  SILT C5-T1  +radial pulse   compartments soft

## 2019-03-02 NOTE — PHYSICAL THERAPY INITIAL EVALUATION ADULT - ACTIVE RANGE OF MOTION EXAMINATION, REHAB EVAL
R UE limited for TSA precautions and Flex. IR pain./bilateral  lower extremity Active ROM was WFL (within functional limits)/Left UE Active ROM was WFL (within functional limits)

## 2019-03-02 NOTE — PHYSICAL THERAPY INITIAL EVALUATION ADULT - PERTINENT HX OF CURRENT PROBLEM, REHAB EVAL
Patient is a 79y old  Female who presents with a chief complaint of inc right shoulder pain, h/o OA, s/p right total shoulder replacement (01 Mar 2019 09:49)

## 2019-03-02 NOTE — PROGRESS NOTE ADULT - SUBJECTIVE AND OBJECTIVE BOX
PCP- DR Pipe Silver    CC- s/p RT TSR    HPI:  80yo/F with PMH HTN, hyperlipidemia, OA with prior RT TKR presented for elective RT TSR. Patient had pain in her RT shoulder affecting her daily activities, she failed outpatient medical treatment and required surgery. Medical consult called for postop medical management    PMH- as above  PSH- RT TKR in 2014, cataracts, LT hand surgery, appendectomy  Soc hx- denies smoking, alcohol-socially  Fam hx- f had heart condition, m had heart condition    3/1/19- c/o headache, otherwise feeling geed  3/2/19 doing good    Review of system- All 10 systems reviewed and is as per HPI otherwise negative.     Vital Signs Last 24 Hrs  T(C): 36.9 (02 Mar 2019 05:45), Max: 36.9 (01 Mar 2019 13:48)  T(F): 98.5 (02 Mar 2019 05:45), Max: 98.5 (01 Mar 2019 13:48)  HR: 110 (02 Mar 2019 05:45) (91 - 110)  BP: 118/77 (02 Mar 2019 12:00) (90/41 - 119/69)  BP(mean): --  RR: 16 (02 Mar 2019 05:45) (16 - 16)  SpO2: 95% (02 Mar 2019 05:45) (94% - 97%)    LABS:                        9.1    11.05 )-----------( 439      ( 02 Mar 2019 06:08 )             29.2     142    |  110    |  25     ----------------------------<  117    4.0     |  25     |  0.78     Ca    8.2        02 Mar 2019 06:08    PT/INR - ( 01 Mar 2019 06:23 )   PT: 12.0 sec;   INR: 1.08 ratio      RADIOLOGY & ADDITIONAL TESTS:    PHYSICAL EXAM:  GENERAL: NAD, well-groomed, well-developed  HEAD:  Atraumatic, Normocephalic  EYES: EOMI, PERRLA, conjunctiva and sclera clear  HEENT: Moist mucous membranes  NECK: Supple, No JVD  NERVOUS SYSTEM:  Alert & Oriented X3, Motor Strength 5/5 B/L upper and lower extremities; DTRs 2+ intact and symmetric  CHEST/LUNG: Clear to auscultation bilaterally; No rales, rhonchi, wheezing, or rubs  HEART: Regular rate and rhythm; No murmurs, rubs, or gallops  ABDOMEN: Soft, Nontender, Nondistended; Bowel sounds present  GENITOURINARY- Voiding, no palpable bladder  EXTREMITIES:  2+ Peripheral Pulses, No clubbing, cyanosis, or edema  MUSCULOSKELTAL- RT shoulder dressing dry, no underlying hematoma  SKIN-no rash, no lesion  CNS- alert, oriented X3, non focal     Daily Height in cm: 152.4 (01 Mar 2019 06:17)      MEDICATIONS  (STANDING):  aspirin enteric coated 325 milliGRAM(s) Oral daily  ATENolol  Tablet 50 milliGRAM(s) Oral at bedtime  atorvastatin Oral Tab/Cap - Peds 10 milliGRAM(s) Oral at bedtime  ceFAZolin   IVPB 2000 milliGRAM(s) IV Intermittent every 6 hours  losartan 50 milliGRAM(s) Oral daily    MEDICATIONS  (PRN):  acetaminophen   Tablet .. 650 milliGRAM(s) Oral every 6 hours PRN Temp greater or equal to 38C (100.4F), Mild Pain (1 - 3)  ondansetron Injectable 4 milliGRAM(s) IV Push every 6 hours PRN Nausea  traMADol 25 milliGRAM(s) Oral every 4 hours PRN Moderate Pain (4 - 6)  traMADol 50 milliGRAM(s) Oral every 4 hours PRN Severe Pain (7 - 10)    Assessment/Plan  #S/p RT TSR  Ortho eval appreciated  PT as tolerated  Pain meds adjusted- states she cant tolerate oxycodone, took tramadol here and worked well  Bowel regimen  Incentive spirometry  AC by Aspirin daily    #HTN/hyperlipidemia- stable    #Dispo- likely home with home PT today. D/w pt and ortho team

## 2019-03-04 LAB — SURGICAL PATHOLOGY FINAL REPORT - CH: SIGNIFICANT CHANGE UP

## 2019-03-26 DIAGNOSIS — I12.9 HYPERTENSIVE CHRONIC KIDNEY DISEASE WITH STAGE 1 THROUGH STAGE 4 CHRONIC KIDNEY DISEASE, OR UNSPECIFIED CHRONIC KIDNEY DISEASE: ICD-10-CM

## 2019-03-26 DIAGNOSIS — M19.011 PRIMARY OSTEOARTHRITIS, RIGHT SHOULDER: ICD-10-CM

## 2019-03-26 DIAGNOSIS — M43.16 SPONDYLOLISTHESIS, LUMBAR REGION: ICD-10-CM

## 2019-03-26 DIAGNOSIS — N18.9 CHRONIC KIDNEY DISEASE, UNSPECIFIED: ICD-10-CM

## 2019-03-26 DIAGNOSIS — M79.7 FIBROMYALGIA: ICD-10-CM

## 2019-03-26 DIAGNOSIS — M75.101 UNSPECIFIED ROTATOR CUFF TEAR OR RUPTURE OF RIGHT SHOULDER, NOT SPECIFIED AS TRAUMATIC: ICD-10-CM

## 2019-03-26 DIAGNOSIS — Z96.651 PRESENCE OF RIGHT ARTIFICIAL KNEE JOINT: ICD-10-CM

## 2019-03-26 DIAGNOSIS — E66.9 OBESITY, UNSPECIFIED: ICD-10-CM

## 2019-03-26 DIAGNOSIS — E78.00 PURE HYPERCHOLESTEROLEMIA, UNSPECIFIED: ICD-10-CM

## 2019-03-26 DIAGNOSIS — I35.0 NONRHEUMATIC AORTIC (VALVE) STENOSIS: ICD-10-CM

## 2019-03-26 DIAGNOSIS — Z86.12 PERSONAL HISTORY OF POLIOMYELITIS: ICD-10-CM

## 2019-03-26 DIAGNOSIS — M48.061 SPINAL STENOSIS, LUMBAR REGION WITHOUT NEUROGENIC CLAUDICATION: ICD-10-CM

## 2019-03-26 DIAGNOSIS — Z79.82 LONG TERM (CURRENT) USE OF ASPIRIN: ICD-10-CM

## 2019-03-26 PROBLEM — M19.019 PRIMARY OSTEOARTHRITIS, UNSPECIFIED SHOULDER: Chronic | Status: ACTIVE | Noted: 2019-02-19

## 2019-03-26 PROBLEM — I10 ESSENTIAL (PRIMARY) HYPERTENSION: Chronic | Status: ACTIVE | Noted: 2019-02-19

## 2019-04-24 ENCOUNTER — EMERGENCY (EMERGENCY)
Facility: HOSPITAL | Age: 79
LOS: 0 days | Discharge: HOME | End: 2019-04-24
Attending: EMERGENCY MEDICINE | Admitting: EMERGENCY MEDICINE
Payer: MEDICARE

## 2019-04-24 VITALS
HEIGHT: 60 IN | DIASTOLIC BLOOD PRESSURE: 80 MMHG | WEIGHT: 164.91 LBS | RESPIRATION RATE: 18 BRPM | OXYGEN SATURATION: 100 % | HEART RATE: 100 BPM | SYSTOLIC BLOOD PRESSURE: 112 MMHG | TEMPERATURE: 98 F

## 2019-04-24 VITALS
DIASTOLIC BLOOD PRESSURE: 74 MMHG | HEART RATE: 81 BPM | OXYGEN SATURATION: 100 % | RESPIRATION RATE: 16 BRPM | SYSTOLIC BLOOD PRESSURE: 118 MMHG
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
## 2019-04-24 DIAGNOSIS — Z90.49 ACQUIRED ABSENCE OF OTHER SPECIFIED PARTS OF DIGESTIVE TRACT: Chronic | ICD-10-CM

## 2019-04-24 DIAGNOSIS — M43.16 SPONDYLOLISTHESIS, LUMBAR REGION: ICD-10-CM

## 2019-04-24 DIAGNOSIS — Z98.890 OTHER SPECIFIED POSTPROCEDURAL STATES: Chronic | ICD-10-CM

## 2019-04-24 DIAGNOSIS — Z96.651 PRESENCE OF RIGHT ARTIFICIAL KNEE JOINT: Chronic | ICD-10-CM

## 2019-04-24 DIAGNOSIS — Z79.82 LONG TERM (CURRENT) USE OF ASPIRIN: ICD-10-CM

## 2019-04-24 DIAGNOSIS — I10 ESSENTIAL (PRIMARY) HYPERTENSION: ICD-10-CM

## 2019-04-24 DIAGNOSIS — Z79.899 OTHER LONG TERM (CURRENT) DRUG THERAPY: ICD-10-CM
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
## 2019-04-24 DIAGNOSIS — Z96.651 PRESENCE OF RIGHT ARTIFICIAL KNEE JOINT: ICD-10-CM

## 2019-04-24 DIAGNOSIS — S01.01XA LACERATION WITHOUT FOREIGN BODY OF SCALP, INITIAL ENCOUNTER: ICD-10-CM
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
## 2019-04-24 DIAGNOSIS — S09.90XA UNSPECIFIED INJURY OF HEAD, INITIAL ENCOUNTER: ICD-10-CM

## 2019-04-24 DIAGNOSIS — Z98.41 CATARACT EXTRACTION STATUS, RIGHT EYE: ICD-10-CM

## 2019-04-24 DIAGNOSIS — Z98.49 CATARACT EXTRACTION STATUS, UNSPECIFIED EYE: Chronic | ICD-10-CM

## 2019-04-24 DIAGNOSIS — E66.9 OBESITY, UNSPECIFIED: ICD-10-CM

## 2019-04-24 DIAGNOSIS — Z90.49 ACQUIRED ABSENCE OF OTHER SPECIFIED PARTS OF DIGESTIVE TRACT: ICD-10-CM

## 2019-04-24 DIAGNOSIS — Z96.659 PRESENCE OF UNSPECIFIED ARTIFICIAL KNEE JOINT: Chronic | ICD-10-CM

## 2019-04-24 DIAGNOSIS — Z98.890 OTHER SPECIFIED POSTPROCEDURAL STATES: ICD-10-CM

## 2019-04-24 DIAGNOSIS — R51 HEADACHE: ICD-10-CM
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
## 2019-04-24 DIAGNOSIS — Z98.42 CATARACT EXTRACTION STATUS, LEFT EYE: ICD-10-CM

## 2019-04-24 DIAGNOSIS — E78.00 PURE HYPERCHOLESTEROLEMIA, UNSPECIFIED: ICD-10-CM

## 2019-04-24 PROCEDURE — 93010 ELECTROCARDIOGRAM REPORT: CPT

## 2019-04-24 PROCEDURE — 70450 CT HEAD/BRAIN W/O DYE: CPT | Mod: 26

## 2019-04-24 PROCEDURE — 99285 EMERGENCY DEPT VISIT HI MDM: CPT

## 2019-04-24 RX ORDER — ACETAMINOPHEN 500 MG
650 TABLET ORAL ONCE
Qty: 0 | Refills: 0 | Status: COMPLETED | OUTPATIENT
Start: 2019-04-24 | End: 2019-04-24

## 2019-04-24 RX ORDER — TETANUS TOXOID, REDUCED DIPHTHERIA TOXOID AND ACELLULAR PERTUSSIS VACCINE, ADSORBED 5; 2.5; 8; 8; 2.5 [IU]/.5ML; [IU]/.5ML; UG/.5ML; UG/.5ML; UG/.5ML
0.5 SUSPENSION INTRAMUSCULAR ONCE
Qty: 0 | Refills: 0 | Status: COMPLETED | OUTPATIENT
Start: 2019-04-24 | End: 2019-04-24

## 2019-04-24 RX ADMIN — Medication 650 MILLIGRAM(S): at 13:43

## 2019-04-24 RX ADMIN — TETANUS TOXOID, REDUCED DIPHTHERIA TOXOID AND ACELLULAR PERTUSSIS VACCINE, ADSORBED 0.5 MILLILITER(S): 5; 2.5; 8; 8; 2.5 SUSPENSION INTRAMUSCULAR at 13:43

## 2019-04-24 NOTE — ED ADULT NURSE NOTE - NSFALLRSKASSESASSIST_ED_ALL_ED
SURY OVALLE  : 1966  ACCOUNT:  [id number]  [phone]  PCP: Dr. 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  The latest one is an admission to [facility] for double pneumonia.  In the patient's opinion, she was discharged too early, and her recovery has been very slow, and she still feels more fatigued than usual.  She has not had significant palpitation.  Her echocardiogram continues to show a normal left ventricular systolic function.  Her BUN and creatinine are quite stable, in fact, better, with a BUN of 25 and a serum creatinine of 1.35.    RISK FACTORS:  CAD - Hypertension Family    REVIEW OF SYSTEMS:  CONS: no fever, chills, or recent weight changes. EYES: denies significant visual changes. ENMT: denies difficulties with hearing, otherwise negative. CV: occasional palpitations. RESP: dyspnea. GI: denies melena, hematochezia. : no hematuria. INTEG: no new rashes, lesions. MS: arthritis back pain. NEURO: occasional lightheaded. HEM/LYMPH: easy bruising. ALL: no new food or environmental allergies.    PAST HISTORY: Hashimoto's hyperthyroidism, endometriosis,, scarlet fever age 5, rt hip bursa removed, renal insufficiency, Hx DVT Rt leg, superficial DVT UE,, Sjogren's syndrome, RA, polyneuritis, asthma, IBS, Seizures (resolved 2012 with Neurontin), cholecystectomy, hysterectomy, laparoscopy, tonsillectomy, right knee replacement  and Rt wrist    PAST CV HISTORY: 2009, cardiac catheterization, dyslipidemia and hypertension    FAMILY HISTORY: Significant for premature CAD. Negative for AAA.  SOCIAL HISTORY: SMOKING: Never used tobacco. Denies smoking. CAFFEINE: no caffeine.  ALCOHOL: drinks rarely. EXERCISE: no regular exercise. DIET: no special diet. MARITAL STATUS: . OCCUPATION: homemaker.    ALLERGIES: Amoxicillin - Oral, Bactrim - Oral, Betadine - External, Cipro - Intravenous, Codeine Sulfate - Oral, EPINEPHrine HCl - Injection, Iodinated Contrast Media - CLASS, Iodinated Diagnostic Agents - CLASS, Ketorolac Tromethamine - Intramuscular, Latex Gloves, Lidocaine, Midazolam, Penicillins - CLASS, Phenergan - Injection, Prochlorperazine - Rectal and Reglan - Injection    MEDICATIONS: Selected prescriptions see below    VITAL SIGNS: [B/P - 102/52 , Pulse - 65, Weight -  153, Height -   64 , BMI - 26.3 ]    CONS: cushingoid and pale. WEIGHT: BMI parameters reviewed and discussed. HEAD/FACE: no trauma and normocephalic. EYES: Dry conjunctivae. ENT: dry oral mucosa. NECK: jugular venous pressure not elevated. RESP: respirations with normal rate and rhythm, clear to auscultation. GI: no masses, tenderness or hepatosplenomegaly, rectal deferred. MS: adequate gait for exercise/testing. EXT: no clubbing or cyanosis.  SKIN: warm to touch.  NEURO/PSYCH: normal gait, normal balance, normal motor.    CV: PALP: PMI not displaced, no lifts and thrills or rub. AUSC:  regular rhythm, normal S1, S2 without S3; no pathologic murmurs. CAROTIDS: carotid pulses normal. ABD AORTA: abdominal aorta not enlarged. FEM: no bruit. PEDAL: pedal pulses normal and symmetric. EXT: no peripheral edema.    ASSESSMENT:  1. Anemia  2. Chronic Kidney Disease Stage II (moderate)  3. Gout  4. Hyperlipidemia, mixed  5. 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 Hyperthyroidism  7. Other seizures  8. Palpitations  9. Systemic lupus erythematosus, unspecified  10. _Dilated Ascending Aorta 3.7cm    PLAN:  [Her vitamin D remains below the lower limits of normal.  However, I will continue the Drisdol at this current dose, given the patient's underlying parenchymal renal disease due to her lupus.  I will see her in six months  with labs only prior to her appointment.]    PRESCRIPTIONS:  Start    Med Name              Dose      Instructions  04/05/17 *Drisdol              [id number]  1 CAPSULE WEEKLY.  08/18/16 *Valtrex              500MG     1 BY MOUTH TWICE DAILY  05/24/16 Oxybutynin Chloride   5MG       1 tablet by mouth daily  07/30/15 Xarelto               20MG      1 TABLET AT BEDTIME.  08/13/14 Folic Acid            1MG       6 tabs by mouth daily  08/13/14 Gabapentin            300MG     1 tab by mouth three times daily  08/13/14 Orencia               250MG     500mg infusion weekly  08/13/14 Pravastatin Sodium    40MG      1 TABLET DAILY AT BEDTIME.  08/13/14 Vitamin B-12          1000MCG/  monthly injection  09/04/13 Lomotil               2.5-0.02  1 TABLET DAILY AS NEEDED.  09/04/13 Sertraline HCl        100MG     2 TABLETS ONCE DAILY.  08/22/12 Fioricet              -4  1 tab by mouth 3x daily as needed  08/22/12 Hydroxychloroquine [id number]     1 TABLET TWICE DAILY.  08/22/12 NexIUM                40MG      1 CAPSULE TWICE DAILY.  08/22/12 Promethazine HCl      25MG      1 TABLET TWICE DAILY AS NEEDED FOR N  08/22/12 Requip XL             2MG       2 tabs by mouth in am  04/15/10 Allopurinol           100MG     1 TABLET DAILY.  04/15/10 Atenolol              25MG      1 TABLET DAILY.  04/15/10 Tricor                145MG     1 TABLET DAILY.      Alla Schuler M.D.    ALVERTO/cathy - DD: 06/23/2017 - DT: 06/26/2017 - Job ID: [id number]   C: Dr. Evi Camacho       no

## 2019-04-24 NOTE — ED PROVIDER NOTE - CARE PLAN
Principal Discharge DX:	CHI (closed head injury), initial encounter  Secondary Diagnosis:	Laceration of scalp, initial encounter

## 2019-04-24 NOTE — ED PROVIDER NOTE - NSFOLLOWUPINSTRUCTIONS_ED_ALL_ED_FT
1. return for worsening symptoms or anything concerning to you  2. take all home meds as prescribed  3. follow up with your pmd call to make an appointment  4. follow up with Dr. Duffy see attached    Laceration    WHAT YOU NEED TO KNOW:    A laceration is an injury to the skin and the soft tissue underneath it. Lacerations happen when you are cut or hit by something. They can happen anywhere on the body.     DISCHARGE INSTRUCTIONS:    Return to the emergency department if:     You have heavy bleeding or bleeding that does not stop after 10 minutes of holding firm, direct pressure over the wound.       Your wound opens up.     Contact your healthcare provider if:     You have a fever or chills.       Your laceration is red, warm, or swollen.      You have red streaks on your skin coming from your wound.      You have white or yellow drainage from the wound that smells bad.      You have pain that gets worse, even after treatment.       You have questions or concerns about your condition or care.     Medicines:     Prescription pain medicine may be given. Ask how to take this medicine safely.       Antibiotics help treat or prevent a bacterial infection.       Take your medicine as directed. Contact your healthcare provider if you think your medicine is not helping or if you have side effects. Tell him or her if you are allergic to any medicine. Keep a list of the medicines, vitamins, and herbs you take. Include the amounts, and when and why you take them. Bring the list or the pill bottles to follow-up visits. Carry your medicine list with you in case of an emergency.    Care for your wound as directed:     Do not get your wound wet until your healthcare provider says it is okay. Do not soak your wound in water. Do not go swimming until your healthcare provider says it is okay. Carefully wash the wound with soap and water. Gently pat the area dry or allow it to air dry.       Change your bandages when they get wet, dirty, or after washing. Apply new, clean bandages as directed. Do not apply elastic bandages or tape too tight. Do not put powders or lotions over your incision.       Apply antibiotic ointment as directed. Your healthcare provider may give you antibiotic ointment to put over your wound if you have stitches. If you have strips of tape over your incision, let them dry up and fall off on their own. If they do not fall off within 14 days, gently remove them. If you have glue over your wound, do not remove or pick at it. If your glue comes off, do not replace it with glue that you have at home.       Check your wound every day for signs of infection such as swelling, redness, or pus.     Self-care:     Apply ice on your wound for 15 to 20 minutes every hour or as directed. Use an ice pack, or put crushed ice in a plastic bag. Cover it with a towel. Ice helps prevent tissue damage and decreases swelling and pain.      Use a splint as directed. A splint will decrease movement and stress on your wound. It may help it heal faster. A splint may be used for lacerations over joints or areas of your body that bend. Ask your healthcare provider how to apply and remove a splint.       Decrease scarring of your wound by applying ointments as directed. Do not apply ointments until your healthcare provider says it is okay. You may need to wait until your wound is healed. Ask which ointment to buy and how often to use it. After your wound is healed, use sunscreen over the area when you are out in the sun. You should do this for at least 6 months to 1 year after your injury.     Follow up with your healthcare provider as directed: You may need to follow up in 24 to 48 hours to have your wound checked for infection. You will need to return in 3 to 14 days if you have stitches or staples so they can be removed. Care for your wound as directed to prevent infection and help it heal. Write down your questions so you remember to ask them during your visits.

## 2019-04-24 NOTE — ED PROVIDER NOTE - CLINICAL SUMMARY MEDICAL DECISION MAKING FREE TEXT BOX
78 y/o female presents s/p trip and fall, hitting left side of head. no LOC. Now with large laceration to left frontal scalp. No other sign of injury. Pt requesting plastics. Will get CT head, plastics and reassess.

## 2019-04-24 NOTE — ED PROVIDER NOTE - NS_ ATTENDINGSCRIBEDETAILS _ED_A_ED_FT
I, Yanick Bauman MD,  performed the initial face to face bedside interview with this patient regarding history of present illness, review of symptoms and relevant past medical, social and family history.  I completed an independent physical examination.  I was the initial provider who evaluated this patient.  The history, relevant review of systems, past medical and surgical history, medical decision making, and physical examination was documented by the scribe in my presence and I attest to the accuracy of the documentation.

## 2019-04-24 NOTE — ED PROVIDER NOTE - CARE PROVIDER_API CALL
Donta Funes)  Plastic Surgery  [address], [address], [place] [address]  Phone: [phone]  Fax: [phone]  Follow Up Time: 4-6 Days

## 2019-04-24 NOTE — ED PROVIDER NOTE - PROGRESS NOTE DETAILS
Ferdinand BLANCO for Dr. ABIODUN Bauman: Pt requesting plastic surgery. Paged Dr. Duffy. dr. lucas at bedside ct unremarkable - dr. lucas repaired wound. pt ambulating without issue. vss. will d/c with follow up and strict return precautions. Yanick Bauman M.D., Attending Physician

## 2019-04-24 NOTE — ED ADULT TRIAGE NOTE - CHIEF COMPLAINT QUOTE
Patient comes to ED for trip and fall, laceration to head, bleeding controlled. -LOC, -blood thinners. pt is AxOx4. denies any pain or discomfort

## 2019-04-24 NOTE — ED ADULT NURSE NOTE - OBJECTIVE STATEMENT
Pt states "I tripped on the sidewalk and fell", pt with lac to left parietal scalp, bleeding controlled at this time, A&O x's 4, c/o pain 3/10

## 2019-04-24 NOTE — ED PROVIDER NOTE - NS ED ROS FT
Constitutional: No fever or chills  Eyes: No visual changes  HEENT: No throat pain  CV: No chest pain  Resp: No SOB no cough  GI: No abd pain, nausea or vomiting  : No dysuria  MSK: No musculoskeletal pain  Skin: No rash +laceration to left frontal forehead   Neuro: +HA

## 2019-04-24 NOTE — ED PROVIDER NOTE - PHYSICAL EXAMINATION
Constitutional: mild distress AAOx3  Eyes: PERRLA EOMI  Head: Normocephalic atraumatic  Mouth: MMM  Cardiac: regular rate   Resp: Lungs CTAB  GI: Abd s/nt/nd  Neuro: CN2-12 intact  Skin: No rashes +8cm laceration to left frontal scalp  Msk: no midline spinal TTP. Pelvis stable. No chest wall TTP. FROM of all 4 extremities. Constitutional: mild distress AAOx3  Eyes: PERRLA EOMI  Head: see below  Mouth: MMM  Cardiac: regular rate   Resp: Lungs CTAB  GI: Abd s/nt/nd  Neuro: CN2-12 intact  Skin: No rashes +8cm laceration to left frontal scalp  Msk: no midline spinal TTP. Pelvis stable. No chest wall TTP. FROM of all 4 extremities.

## 2019-04-24 NOTE — ED ADULT NURSE NOTE - NSIMPLEMENTINTERV_GEN_ALL_ED
Implemented All Fall Risk Interventions:  [address] to call system. Call bell, personal items and telephone within reach. Instruct patient to call for assistance. Room bathroom lighting operational. Non-slip footwear when patient is off stretcher. Physically safe environment: no spills, clutter or unnecessary equipment. Stretcher in lowest position, wheels locked, appropriate side rails in place. Provide visual cue, wrist band, yellow gown, etc. Monitor gait and stability. Monitor for mental status changes and reorient to person, place, and time. Review medications for side effects contributing to fall risk. Reinforce activity limits and safety measures with patient and family.

## 2019-04-24 NOTE — ED PROVIDER NOTE - OBJECTIVE STATEMENT
80 y/o female with PMHx of B/L shoulder surgery on 3/1 with Dr. Loja, HTN presents to the ED s/p mechanical fall c/o HA with laceration to left frontal scalp. Denies LOC. Denies any other pain or complaints. No blood thinners. Nonsmoker. No ETOH use. NKDA. Tetanus shot not UTD.

## 2019-07-02 ENCOUNTER — OUTPATIENT (OUTPATIENT)
Dept: OUTPATIENT SERVICES | Facility: HOSPITAL | Age: 79
LOS: 1 days | Discharge: HOME | End: 2019-07-02

## 2019-07-02 VITALS
RESPIRATION RATE: 16 BRPM | TEMPERATURE: 99 F | SYSTOLIC BLOOD PRESSURE: 138 MMHG | DIASTOLIC BLOOD PRESSURE: 72 MMHG | WEIGHT: 171.08 LBS | OXYGEN SATURATION: 97 % | HEART RATE: 86 BPM | HEIGHT: 60 IN

## 2019-07-02 DIAGNOSIS — M19.012 PRIMARY OSTEOARTHRITIS, LEFT SHOULDER: ICD-10-CM

## 2019-07-02 DIAGNOSIS — Z29.9 ENCOUNTER FOR PROPHYLACTIC MEASURES, UNSPECIFIED: ICD-10-CM

## 2019-07-02 DIAGNOSIS — Z96.611 PRESENCE OF RIGHT ARTIFICIAL SHOULDER JOINT: Chronic | ICD-10-CM

## 2019-07-02 DIAGNOSIS — Z96.659 PRESENCE OF UNSPECIFIED ARTIFICIAL KNEE JOINT: Chronic | ICD-10-CM

## 2019-07-02 DIAGNOSIS — Z90.49 ACQUIRED ABSENCE OF OTHER SPECIFIED PARTS OF DIGESTIVE TRACT: Chronic | ICD-10-CM

## 2019-07-02 DIAGNOSIS — Z98.49 CATARACT EXTRACTION STATUS, UNSPECIFIED EYE: Chronic | ICD-10-CM

## 2019-07-02 DIAGNOSIS — Z96.651 PRESENCE OF RIGHT ARTIFICIAL KNEE JOINT: Chronic | ICD-10-CM

## 2019-07-02 DIAGNOSIS — Z98.890 OTHER SPECIFIED POSTPROCEDURAL STATES: Chronic | ICD-10-CM

## 2019-07-02 LAB
ALBUMIN SERPL ELPH-MCNC: 3.5 G/DL — SIGNIFICANT CHANGE UP (ref 3.3–5)
ALP SERPL-CCNC: 127 U/L — HIGH (ref 40–120)
ALT FLD-CCNC: 18 U/L — SIGNIFICANT CHANGE UP (ref 12–78)
ANION GAP SERPL CALC-SCNC: 6 MMOL/L — SIGNIFICANT CHANGE UP (ref 5–17)
ANISOCYTOSIS BLD QL: SIGNIFICANT CHANGE UP
APPEARANCE UR: CLEAR — SIGNIFICANT CHANGE UP
AST SERPL-CCNC: 16 U/L — SIGNIFICANT CHANGE UP (ref 15–37)
BACTERIA # UR AUTO: ABNORMAL
BASOPHILS # BLD AUTO: 0.03 K/UL — SIGNIFICANT CHANGE UP (ref 0–0.2)
BASOPHILS NFR BLD AUTO: 0.3 % — SIGNIFICANT CHANGE UP (ref 0–2)
BILIRUB SERPL-MCNC: 0.2 MG/DL — SIGNIFICANT CHANGE UP (ref 0.2–1.2)
BILIRUB UR-MCNC: NEGATIVE — SIGNIFICANT CHANGE UP
BLD GP AB SCN SERPL QL: SIGNIFICANT CHANGE UP
BUN SERPL-MCNC: 23 MG/DL — SIGNIFICANT CHANGE UP (ref 7–23)
CALCIUM SERPL-MCNC: 9 MG/DL — SIGNIFICANT CHANGE UP (ref 8.5–10.1)
CHLORIDE SERPL-SCNC: 109 MMOL/L — HIGH (ref 96–108)
CO2 SERPL-SCNC: 26 MMOL/L — SIGNIFICANT CHANGE UP (ref 22–31)
COLOR SPEC: YELLOW — SIGNIFICANT CHANGE UP
CREAT SERPL-MCNC: 0.7 MG/DL — SIGNIFICANT CHANGE UP (ref 0.5–1.3)
DIFF PNL FLD: NEGATIVE — SIGNIFICANT CHANGE UP
EOSINOPHIL # BLD AUTO: 0.1 K/UL — SIGNIFICANT CHANGE UP (ref 0–0.5)
EOSINOPHIL NFR BLD AUTO: 0.9 % — SIGNIFICANT CHANGE UP (ref 0–6)
GLUCOSE SERPL-MCNC: 87 MG/DL — SIGNIFICANT CHANGE UP (ref 70–99)
GLUCOSE UR QL: NEGATIVE MG/DL — SIGNIFICANT CHANGE UP
HCT VFR BLD CALC: 32 % — LOW (ref 34.5–45)
HGB BLD-MCNC: 9.4 G/DL — LOW (ref 11.5–15.5)
IMM GRANULOCYTES NFR BLD AUTO: 0.3 % — SIGNIFICANT CHANGE UP (ref 0–1.5)
KETONES UR-MCNC: NEGATIVE — SIGNIFICANT CHANGE UP
LEUKOCYTE ESTERASE UR-ACNC: ABNORMAL
LYMPHOCYTES # BLD AUTO: 1.92 K/UL — SIGNIFICANT CHANGE UP (ref 1–3.3)
LYMPHOCYTES # BLD AUTO: 17.4 % — SIGNIFICANT CHANGE UP (ref 13–44)
MANUAL SMEAR VERIFICATION: SIGNIFICANT CHANGE UP
MCHC RBC-ENTMCNC: 20.1 PG — LOW (ref 27–34)
MCHC RBC-ENTMCNC: 29.4 GM/DL — LOW (ref 32–36)
MCV RBC AUTO: 68.5 FL — LOW (ref 80–100)
MICROCYTES BLD QL: SIGNIFICANT CHANGE UP
MONOCYTES # BLD AUTO: 0.74 K/UL — SIGNIFICANT CHANGE UP (ref 0–0.9)
MONOCYTES NFR BLD AUTO: 6.7 % — SIGNIFICANT CHANGE UP (ref 2–14)
NEUTROPHILS # BLD AUTO: 8.23 K/UL — HIGH (ref 1.8–7.4)
NEUTROPHILS NFR BLD AUTO: 74.4 % — SIGNIFICANT CHANGE UP (ref 43–77)
NITRITE UR-MCNC: POSITIVE
OVALOCYTES BLD QL SMEAR: SLIGHT — SIGNIFICANT CHANGE UP
PH UR: 5 — SIGNIFICANT CHANGE UP (ref 5–8)
PLAT MORPH BLD: NORMAL — SIGNIFICANT CHANGE UP
PLATELET # BLD AUTO: 471 K/UL — HIGH (ref 150–400)
POIKILOCYTOSIS BLD QL AUTO: SLIGHT — SIGNIFICANT CHANGE UP
POLYCHROMASIA BLD QL SMEAR: SLIGHT — SIGNIFICANT CHANGE UP
POTASSIUM SERPL-MCNC: 4.2 MMOL/L — SIGNIFICANT CHANGE UP (ref 3.5–5.3)
POTASSIUM SERPL-SCNC: 4.2 MMOL/L — SIGNIFICANT CHANGE UP (ref 3.5–5.3)
PROT SERPL-MCNC: 7.4 GM/DL — SIGNIFICANT CHANGE UP (ref 6–8.3)
PROT UR-MCNC: NEGATIVE MG/DL — SIGNIFICANT CHANGE UP
RBC # BLD: 4.67 M/UL — SIGNIFICANT CHANGE UP (ref 3.8–5.2)
RBC # FLD: 19 % — HIGH (ref 10.3–14.5)
RBC BLD AUTO: ABNORMAL
SODIUM SERPL-SCNC: 141 MMOL/L — SIGNIFICANT CHANGE UP (ref 135–145)
SP GR SPEC: 1.02 — SIGNIFICANT CHANGE UP (ref 1.01–1.02)
TARGETS BLD QL SMEAR: SLIGHT — SIGNIFICANT CHANGE UP
TYPE + AB SCN PNL BLD: SIGNIFICANT CHANGE UP
UROBILINOGEN FLD QL: NEGATIVE MG/DL — SIGNIFICANT CHANGE UP
WBC # BLD: 11.05 K/UL — HIGH (ref 3.8–10.5)
WBC # FLD AUTO: 11.05 K/UL — HIGH (ref 3.8–10.5)
WBC UR QL: SIGNIFICANT CHANGE UP

## 2019-07-02 NOTE — H&P PST ADULT - ASSESSMENT
80 y/o female with right shoulder osteoarthritis. Complain of chronic right shoulder pain. Scheduled for Reverse right total shoulder replacement.   Plan  1. Stop all NSAIDS, herbal supplements and vitamins for 7 days.  2. NPO at midnight.  3. Use EZ sponges as directed  4. Use mupirocin as directed  5. Labs, EKG, CXR as per surgeon  6. PMD/cardiologist visit for optimization prior to surgery as per surgeon.    CAPRINI SCORE [CLOT]  AGE RELATED RISK FACTORS                                                       MOBILITY RELATED FACTORS  [ ] Age 41-60 years                                            (1 Point)                  [ ] Bed rest                                                        (1 Point)  [ ] Age: 61-74 years                                           (2 Points)                 [ ] Plaster cast                                                   (2 Points)  [x ] Age= 75 years                                              (3 Points)                 [ ] Bed bound for more than 72 hours                 (2 Points)    DISEASE RELATED RISK FACTORS                                               GENDER SPECIFIC FACTORS  [ ] Edema in the lower extremities                       (1 Point)                  [ ] Pregnancy                                                     (1 Point)  [ ] Varicose veins                                               (1 Point)                  [ ] Post-partum < 6 weeks                                   (1 Point)             [x ] BMI > 25 Kg/m2                                            (1 Point)                  [ ] Hormonal therapy  or oral contraception          (1 Point)                 [ ] Sepsis (in the previous month)                        (1 Point)                  [ ] History of pregnancy complications                 (1 point)  [ ] Pneumonia or serious lung disease                                               [ ] Unexplained or recurrent                     (1 Point)           (in the previous month)                               (1 Point)  [ ] Abnormal pulmonary function test                     (1 Point)                 SURGERY RELATED RISK FACTORS  [ ] Acute myocardial infarction                              (1 Point)                 [ ]  Section                                             (1 Point)  [ ] Congestive heart failure (in the previous month)  (1 Point)               [ ] Minor surgery                                                  (1 Point)   [ ] Inflammatory bowel disease                             (1 Point)                 [ ] Arthroscopic surgery                                        (2 Points)  [ ] Central venous access                                      (2 Points)                [ ] General surgery lasting more than 45 minutes   (2 Points)       [ ] Stroke (in the previous month)                          (5 Points)               [ x ] Elective arthroplasty                                         (5 Points)     ()  malignancy                                                             (2 points )                                                                                                                                      HEMATOLOGY RELATED FACTORS                                                 TRAUMA RELATED RISK FACTORS  [ ] Prior episodes of VTE                                     (3 Points)                 [ ] Fracture of the hip, pelvis, or leg                       (5 Points)  [ ] Positive family history for VTE                         (3 Points)                 [ ] Acute spinal cord injury (in the previous month)  (5 Points)  [ ] Prothrombin 75225 A                                     (3 Points)                 [ ] Paralysis  (less than 1 month)                             (5 Points)  [ ] Factor V Leiden                                             (3 Points)                  [ ] Multiple Trauma within 1 month                        (5 Points)  [ ] Lupus anticoagulants                                     (3 Points)                                                           [ ] Anticardiolipin antibodies                               (3 Points)                                                       [ ] High homocysteine in the blood                      (3 Points)                                             [ ] Other congenital or acquired thrombophilia      (3 Points)                                                [ ] Heparin induced thrombocytopenia                  (3 Points)    (  ) Malignancy                                        Total Score [   9       ] 80 y/o female with right shoulder osteoarthritis. Complain of chronic right shoulder pain. Scheduled for Reverse right total shoulder replacement.   Plan  1. Stop all NSAIDS, herbal supplements and vitamins for 7 days.  2. NPO at midnight.  3. Use EZ sponges as directed  4. Use mupirocin as directed  5. Labs, EKG, CXR as per surgeon  6. PMD/cardiologist visit for optimization prior to surgery as per surgeon.  7. pt/inr day of surgery     CAPRINI SCORE [CLOT]  AGE RELATED RISK FACTORS                                                       MOBILITY RELATED FACTORS  [ ] Age 41-60 years                                            (1 Point)                  [ ] Bed rest                                                        (1 Point)  [ ] Age: 61-74 years                                           (2 Points)                 [ ] Plaster cast                                                   (2 Points)  [x ] Age= 75 years                                              (3 Points)                 [ ] Bed bound for more than 72 hours                 (2 Points)    DISEASE RELATED RISK FACTORS                                               GENDER SPECIFIC FACTORS  [ ] Edema in the lower extremities                       (1 Point)                  [ ] Pregnancy                                                     (1 Point)  [ ] Varicose veins                                               (1 Point)                  [ ] Post-partum < 6 weeks                                   (1 Point)             [x ] BMI > 25 Kg/m2                                            (1 Point)                  [ ] Hormonal therapy  or oral contraception          (1 Point)                 [ ] Sepsis (in the previous month)                        (1 Point)                  [ ] History of pregnancy complications                 (1 point)  [ ] Pneumonia or serious lung disease                                               [ ] Unexplained or recurrent                     (1 Point)           (in the previous month)                               (1 Point)  [ ] Abnormal pulmonary function test                     (1 Point)                 SURGERY RELATED RISK FACTORS  [ ] Acute myocardial infarction                              (1 Point)                 [ ]  Section                                             (1 Point)  [ ] Congestive heart failure (in the previous month)  (1 Point)               [ ] Minor surgery                                                  (1 Point)   [ ] Inflammatory bowel disease                             (1 Point)                 [ ] Arthroscopic surgery                                        (2 Points)  [ ] Central venous access                                      (2 Points)                [ ] General surgery lasting more than 45 minutes   (2 Points)       [ ] Stroke (in the previous month)                          (5 Points)               [ x ] Elective arthroplasty                                         (5 Points)     ()  malignancy                                                             (2 points )                                                                                                                                      HEMATOLOGY RELATED FACTORS                                                 TRAUMA RELATED RISK FACTORS  [ ] Prior episodes of VTE                                     (3 Points)                 [ ] Fracture of the hip, pelvis, or leg                       (5 Points)  [ ] Positive family history for VTE                         (3 Points)                 [ ] Acute spinal cord injury (in the previous month)  (5 Points)  [ ] Prothrombin 01180 A                                     (3 Points)                 [ ] Paralysis  (less than 1 month)                             (5 Points)  [ ] Factor V Leiden                                             (3 Points)                  [ ] Multiple Trauma within 1 month                        (5 Points)  [ ] Lupus anticoagulants                                     (3 Points)                                                           [ ] Anticardiolipin antibodies                               (3 Points)                                                       [ ] High homocysteine in the blood                      (3 Points)                                             [ ] Other congenital or acquired thrombophilia      (3 Points)                                                [ ] Heparin induced thrombocytopenia                  (3 Points)    (  ) Malignancy                                        Total Score [   9       ]

## 2019-07-02 NOTE — H&P PST ADULT - HISTORY OF PRESENT ILLNESS
78 y/o female with right shoulder osteoarthritis. Complain of chronic right shoulder pain. Takes NSAID with mild pain relief. Scheduled for Reverse left  total shoulder replacement.

## 2019-07-02 NOTE — H&P PST ADULT - NSICDXPASTMEDICALHX_GEN_ALL_CORE_FT
PAST MEDICAL HISTORY:  HTN (hypertension)     Hypercholesterolemia     OA (osteoarthritis) of shoulder     Obesity     Seasonal allergies     Spondylolisthesis of lumbar region PAST MEDICAL HISTORY:  Heart murmur     HTN (hypertension)     Hypercholesterolemia     OA (osteoarthritis) of shoulder     Obesity     Restless legs syndrome (RLS)     Seasonal allergies     Spondylolisthesis of lumbar region

## 2019-07-02 NOTE — H&P PST ADULT - NSICDXFAMILYHX_GEN_ALL_CORE_FT
FAMILY HISTORY:  Father  Still living? Unknown  Family history of heart disease, Age at diagnosis: Age Unknown    Mother  Still living? Unknown  Family history of cerebrovascular accident (CVA) in mother, Age at diagnosis: Age Unknown  Family history of heart disease, Age at diagnosis: Age Unknown

## 2019-07-02 NOTE — H&P PST ADULT - NSICDXPASTSURGICALHX_GEN_ALL_CORE_FT
PAST SURGICAL HISTORY:  H/O total knee replacement, right 2014    History of cataract surgery b/l 2006 2008    History of hand surgery left hand 2016    History of revision of total knee arthroplasty right knee 2014    S/P appendectomy 2004    S/P shoulder replacement, right 3/2019

## 2019-07-03 LAB
MRSA PCR RESULT.: SIGNIFICANT CHANGE UP
S AUREUS DNA NOSE QL NAA+PROBE: DETECTED

## 2019-07-12 ENCOUNTER — RESULT REVIEW (OUTPATIENT)
Age: 79
End: 2019-07-12

## 2019-07-12 ENCOUNTER — TRANSCRIPTION ENCOUNTER (OUTPATIENT)
Age: 79
End: 2019-07-12

## 2019-07-12 ENCOUNTER — INPATIENT (INPATIENT)
Facility: HOSPITAL | Age: 79
LOS: 0 days | Discharge: HOME | End: 2019-07-13
Attending: ORTHOPAEDIC SURGERY | Admitting: ORTHOPAEDIC SURGERY
Payer: MEDICARE

## 2019-07-12 VITALS
HEART RATE: 92 BPM | DIASTOLIC BLOOD PRESSURE: 77 MMHG | TEMPERATURE: 98 F | SYSTOLIC BLOOD PRESSURE: 124 MMHG | RESPIRATION RATE: 18 BRPM | OXYGEN SATURATION: 98 %

## 2019-07-12 VITALS
RESPIRATION RATE: 16 BRPM | TEMPERATURE: 99 F | SYSTOLIC BLOOD PRESSURE: 136 MMHG | OXYGEN SATURATION: 99 % | HEIGHT: 60 IN | DIASTOLIC BLOOD PRESSURE: 70 MMHG | WEIGHT: 171.08 LBS | HEART RATE: 85 BPM

## 2019-07-12 DIAGNOSIS — Z98.49 CATARACT EXTRACTION STATUS, UNSPECIFIED EYE: Chronic | ICD-10-CM

## 2019-07-12 DIAGNOSIS — Z96.659 PRESENCE OF UNSPECIFIED ARTIFICIAL KNEE JOINT: Chronic | ICD-10-CM

## 2019-07-12 DIAGNOSIS — Z96.611 PRESENCE OF RIGHT ARTIFICIAL SHOULDER JOINT: Chronic | ICD-10-CM

## 2019-07-12 DIAGNOSIS — Z96.651 PRESENCE OF RIGHT ARTIFICIAL KNEE JOINT: Chronic | ICD-10-CM

## 2019-07-12 DIAGNOSIS — Z98.890 OTHER SPECIFIED POSTPROCEDURAL STATES: Chronic | ICD-10-CM

## 2019-07-12 DIAGNOSIS — Z90.49 ACQUIRED ABSENCE OF OTHER SPECIFIED PARTS OF DIGESTIVE TRACT: Chronic | ICD-10-CM

## 2019-07-12 LAB
ANION GAP SERPL CALC-SCNC: 5 MMOL/L — SIGNIFICANT CHANGE UP (ref 5–17)
BUN SERPL-MCNC: 20 MG/DL — SIGNIFICANT CHANGE UP (ref 7–23)
CALCIUM SERPL-MCNC: 8.7 MG/DL — SIGNIFICANT CHANGE UP (ref 8.5–10.1)
CHLORIDE SERPL-SCNC: 108 MMOL/L — SIGNIFICANT CHANGE UP (ref 96–108)
CO2 SERPL-SCNC: 28 MMOL/L — SIGNIFICANT CHANGE UP (ref 22–31)
CREAT SERPL-MCNC: 0.67 MG/DL — SIGNIFICANT CHANGE UP (ref 0.5–1.3)
GLUCOSE SERPL-MCNC: 131 MG/DL — HIGH (ref 70–99)
HCT VFR BLD CALC: 30.6 % — LOW (ref 34.5–45)
HGB BLD-MCNC: 9 G/DL — LOW (ref 11.5–15.5)
INR BLD: 0.99 RATIO — SIGNIFICANT CHANGE UP (ref 0.88–1.16)
MCHC RBC-ENTMCNC: 20.5 PG — LOW (ref 27–34)
MCHC RBC-ENTMCNC: 29.4 GM/DL — LOW (ref 32–36)
MCV RBC AUTO: 69.9 FL — LOW (ref 80–100)
PLATELET # BLD AUTO: 431 K/UL — HIGH (ref 150–400)
POTASSIUM SERPL-MCNC: 4 MMOL/L — SIGNIFICANT CHANGE UP (ref 3.5–5.3)
POTASSIUM SERPL-SCNC: 4 MMOL/L — SIGNIFICANT CHANGE UP (ref 3.5–5.3)
PROTHROM AB SERPL-ACNC: 11 SEC — SIGNIFICANT CHANGE UP (ref 10–12.9)
RBC # BLD: 4.38 M/UL — SIGNIFICANT CHANGE UP (ref 3.8–5.2)
RBC # FLD: 19.9 % — HIGH (ref 10.3–14.5)
SODIUM SERPL-SCNC: 141 MMOL/L — SIGNIFICANT CHANGE UP (ref 135–145)
WBC # BLD: 14.93 K/UL — HIGH (ref 3.8–10.5)
WBC # FLD AUTO: 14.93 K/UL — HIGH (ref 3.8–10.5)

## 2019-07-12 PROCEDURE — 99223 1ST HOSP IP/OBS HIGH 75: CPT

## 2019-07-12 PROCEDURE — 73030 X-RAY EXAM OF SHOULDER: CPT | Mod: 26,LT

## 2019-07-12 PROCEDURE — 88304 TISSUE EXAM BY PATHOLOGIST: CPT | Mod: 26

## 2019-07-12 PROCEDURE — 23472 RECONSTRUCT SHOULDER JOINT: CPT | Mod: AS

## 2019-07-12 PROCEDURE — 88311 DECALCIFY TISSUE: CPT | Mod: 26

## 2019-07-12 RX ORDER — FENTANYL CITRATE 50 UG/ML
50 INJECTION INTRAVENOUS
Refills: 0 | Status: DISCONTINUED | OUTPATIENT
Start: 2019-07-12 | End: 2019-07-12

## 2019-07-12 RX ORDER — ONDANSETRON 8 MG/1
4 TABLET, FILM COATED ORAL EVERY 6 HOURS
Refills: 0 | Status: DISCONTINUED | OUTPATIENT
Start: 2019-07-12 | End: 2019-07-13

## 2019-07-12 RX ORDER — ONDANSETRON 8 MG/1
4 TABLET, FILM COATED ORAL ONCE
Refills: 0 | Status: DISCONTINUED | OUTPATIENT
Start: 2019-07-12 | End: 2019-07-12

## 2019-07-12 RX ORDER — ACETAMINOPHEN 500 MG
1000 TABLET ORAL ONCE
Refills: 0 | Status: COMPLETED | OUTPATIENT
Start: 2019-07-12 | End: 2019-07-12

## 2019-07-12 RX ORDER — ACETAMINOPHEN 500 MG
0 TABLET ORAL
Qty: 0 | Refills: 0 | DISCHARGE

## 2019-07-12 RX ORDER — MUPIROCIN 20 MG/G
1 OINTMENT TOPICAL
Qty: 0 | Refills: 0 | DISCHARGE

## 2019-07-12 RX ORDER — BENZOCAINE AND MENTHOL 5; 1 G/100ML; G/100ML
1 LIQUID ORAL
Refills: 0 | Status: DISCONTINUED | OUTPATIENT
Start: 2019-07-12 | End: 2019-07-12

## 2019-07-12 RX ORDER — SODIUM CHLORIDE 9 MG/ML
1000 INJECTION, SOLUTION INTRAVENOUS
Refills: 0 | Status: DISCONTINUED | OUTPATIENT
Start: 2019-07-12 | End: 2019-07-13

## 2019-07-12 RX ORDER — BENZOCAINE AND MENTHOL 5; 1 G/100ML; G/100ML
1 LIQUID ORAL
Refills: 0 | Status: DISCONTINUED | OUTPATIENT
Start: 2019-07-12 | End: 2019-07-13

## 2019-07-12 RX ORDER — ATORVASTATIN CALCIUM 80 MG/1
10 TABLET, FILM COATED ORAL DAILY
Refills: 0 | Status: DISCONTINUED | OUTPATIENT
Start: 2019-07-12 | End: 2019-07-13

## 2019-07-12 RX ORDER — SODIUM CHLORIDE 9 MG/ML
1000 INJECTION, SOLUTION INTRAVENOUS
Refills: 0 | Status: DISCONTINUED | OUTPATIENT
Start: 2019-07-12 | End: 2019-07-12

## 2019-07-12 RX ORDER — ACETAMINOPHEN 500 MG
650 TABLET ORAL EVERY 6 HOURS
Refills: 0 | Status: DISCONTINUED | OUTPATIENT
Start: 2019-07-12 | End: 2019-07-13

## 2019-07-12 RX ORDER — PANTOPRAZOLE SODIUM 20 MG/1
40 TABLET, DELAYED RELEASE ORAL
Refills: 0 | Status: DISCONTINUED | OUTPATIENT
Start: 2019-07-12 | End: 2019-07-13

## 2019-07-12 RX ORDER — ASPIRIN/CALCIUM CARB/MAGNESIUM 324 MG
325 TABLET ORAL
Refills: 0 | Status: DISCONTINUED | OUTPATIENT
Start: 2019-07-12 | End: 2019-07-13

## 2019-07-12 RX ORDER — SENNA PLUS 8.6 MG/1
2 TABLET ORAL AT BEDTIME
Refills: 0 | Status: DISCONTINUED | OUTPATIENT
Start: 2019-07-12 | End: 2019-07-13

## 2019-07-12 RX ORDER — TRAMADOL HYDROCHLORIDE 50 MG/1
25 TABLET ORAL EVERY 4 HOURS
Refills: 0 | Status: DISCONTINUED | OUTPATIENT
Start: 2019-07-12 | End: 2019-07-13

## 2019-07-12 RX ORDER — ASPIRIN/CALCIUM CARB/MAGNESIUM 324 MG
1 TABLET ORAL
Qty: 14 | Refills: 0
Start: 2019-07-12 | End: 2019-07-25

## 2019-07-12 RX ORDER — POLYETHYLENE GLYCOL 3350 17 G/17G
17 POWDER, FOR SOLUTION ORAL DAILY
Refills: 0 | Status: DISCONTINUED | OUTPATIENT
Start: 2019-07-12 | End: 2019-07-13

## 2019-07-12 RX ORDER — MAGNESIUM HYDROXIDE 400 MG/1
30 TABLET, CHEWABLE ORAL DAILY
Refills: 0 | Status: DISCONTINUED | OUTPATIENT
Start: 2019-07-12 | End: 2019-07-13

## 2019-07-12 RX ORDER — ATENOLOL 25 MG/1
50 TABLET ORAL AT BEDTIME
Refills: 0 | Status: DISCONTINUED | OUTPATIENT
Start: 2019-07-12 | End: 2019-07-13

## 2019-07-12 RX ORDER — DOCUSATE SODIUM 100 MG
100 CAPSULE ORAL THREE TIMES A DAY
Refills: 0 | Status: DISCONTINUED | OUTPATIENT
Start: 2019-07-12 | End: 2019-07-13

## 2019-07-12 RX ORDER — LOSARTAN POTASSIUM 100 MG/1
100 TABLET, FILM COATED ORAL DAILY
Refills: 0 | Status: DISCONTINUED | OUTPATIENT
Start: 2019-07-12 | End: 2019-07-13

## 2019-07-12 RX ORDER — TRAMADOL HYDROCHLORIDE 50 MG/1
50 TABLET ORAL EVERY 4 HOURS
Refills: 0 | Status: DISCONTINUED | OUTPATIENT
Start: 2019-07-12 | End: 2019-07-13

## 2019-07-12 RX ORDER — IBUPROFEN 200 MG
1 TABLET ORAL
Qty: 0 | Refills: 0 | DISCHARGE

## 2019-07-12 RX ORDER — CEFAZOLIN SODIUM 1 G
2000 VIAL (EA) INJECTION EVERY 8 HOURS
Refills: 0 | Status: COMPLETED | OUTPATIENT
Start: 2019-07-12 | End: 2019-07-12

## 2019-07-12 RX ADMIN — Medication 325 MILLIGRAM(S): at 18:32

## 2019-07-12 RX ADMIN — LOSARTAN POTASSIUM 100 MILLIGRAM(S): 100 TABLET, FILM COATED ORAL at 22:47

## 2019-07-12 RX ADMIN — Medication 100 MILLIGRAM(S): at 22:37

## 2019-07-12 RX ADMIN — ATENOLOL 50 MILLIGRAM(S): 25 TABLET ORAL at 22:37

## 2019-07-12 RX ADMIN — Medication 650 MILLIGRAM(S): at 15:04

## 2019-07-12 RX ADMIN — Medication 400 MILLIGRAM(S): at 22:37

## 2019-07-12 RX ADMIN — Medication 100 MILLIGRAM(S): at 15:31

## 2019-07-12 RX ADMIN — Medication 650 MILLIGRAM(S): at 14:34

## 2019-07-12 RX ADMIN — Medication 1000 MILLIGRAM(S): at 23:07

## 2019-07-12 RX ADMIN — FENTANYL CITRATE 50 MICROGRAM(S): 50 INJECTION INTRAVENOUS at 10:14

## 2019-07-12 RX ADMIN — FENTANYL CITRATE 50 MICROGRAM(S): 50 INJECTION INTRAVENOUS at 10:19

## 2019-07-12 RX ADMIN — ATORVASTATIN CALCIUM 10 MILLIGRAM(S): 80 TABLET, FILM COATED ORAL at 22:37

## 2019-07-12 RX ADMIN — BENZOCAINE AND MENTHOL 1 LOZENGE: 5; 1 LIQUID ORAL at 15:31

## 2019-07-12 NOTE — DISCHARGE NOTE PROVIDER - CARE PROVIDER_API CALL
Molina Loja)  Orthopaedic Surgery  [address] [place] [address] [place] [address]  Phone: [phone]  Fax: [phone]  Follow Up Time:

## 2019-07-12 NOTE — DISCHARGE NOTE PROVIDER - HOSPITAL COURSE
H&P:    Pt is a 79y Female  PAST MEDICAL & SURGICAL HISTORY:    Heart murmur    Restless legs syndrome (RLS)    Seasonal allergies    Spondylolisthesis of lumbar region    OA (osteoarthritis) of shoulder    Hypercholesterolemia    Obesity    HTN (hypertension)    S/P shoulder replacement, right: 3/2019    History of hand surgery: left hand 2016    History of revision of total knee arthroplasty: right knee 2014    H/O total knee replacement, right: 2014    History of cataract surgery: b/l 2006 2008    S/P appendectomy: 2004             Now s/p (Reverese**) Total Shoulder Arthroplasty. Pt is afebrile with stable vital signs. Pain is controlled. Alert and Oriented. Exam reveals intact AIN/PIN, wrist flexion and wrist extension, 2+Radial Pulse. Dressing is clean and dry with telfa/tegaderm on.        Vital Signs Last 24 Hrs    T(C): 36.3 (12 Jul 2019 10:05), Max: 37.3 (12 Jul 2019 07:01)    T(F): 97.3 (12 Jul 2019 10:05), Max: 99.2 (12 Jul 2019 07:01)    HR: 81 (12 Jul 2019 10:30) (81 - 85)    BP: 174/94 (12 Jul 2019 10:30) (136/70 - 174/94)    BP(mean): --    RR: 15 (12 Jul 2019 10:30) (13 - 19)    SpO2: 100% (12 Jul 2019 10:30) (96% - 100%)                        Hospital Course:    Patient presented to [facility] medically cleared for Shoulder Replacement Surgery, having failed outpatient non-operative conservative management. Prophylactic antibiotics were started before the procedure and continued for 24 hours. They were admitted after surgery to the orthopedic floor.   There were no complications during the hospital stay.         Routine consults were obtained from the Anticoagulation Team for DVT/PE prophylaxis, from Physical Therapy for twice daily PT ROM limited to full forward elevation, Internal rotation to Chest, external rotation to neutral, no extension. Consult to Hospitalist for Medical Co-management. Patient was placed on EC Aspirin 325 BID for anticoagulation.  Pertinent home medications were continued.  Daily labs were followed.          On POD 0 there were no overnight events and the pt was OOB with PT. POD 1, PT was continued, and anticipate POD 1 DC to home. The orthopedic Attending is aware and agrees.

## 2019-07-12 NOTE — CONSULT NOTE ADULT - SUBJECTIVE AND OBJECTIVE BOX
HPI  HPI:      Patient is a 79y old  Female who presents with a chief complaint of left shoulder replacement (2019 16:02)      Consulted by            for VTE prophylaxis, risk stratification, and anticoagulation management.    PAST MEDICAL & SURGICAL HISTORY:  Heart murmur  Restless legs syndrome (RLS)  Seasonal allergies  Spondylolisthesis of lumbar region  OA (osteoarthritis) of shoulder  Hypercholesterolemia  Obesity  HTN (hypertension)  S/P shoulder replacement, right: 3/2019  History of hand surgery: left hand   History of revision of total knee arthroplasty: right knee   H/O total knee replacement, right:   History of cataract surgery: b/l 2006  S/P appendectomy:           CAPRINI SCORE  AGE RELATED RISK FACTORS                                                       MOBILITY RELATED FACTORS  [ ] Age 41-60 years                                            (1 Point)                  [ ] Bed rest                                                        (1 Point)  [ ] Age: 61-74 years                                           (2 Points)                [ ] Plaster cast                                                   (2 Points)  [x ] Age= 75 years                                              (3 Points)                 [ ] Bed bound for more than 72 hours                   (2 Points)    DISEASE RELATED RISK FACTORS                                               GENDER SPECIFIC FACTORS  [ ] Edema in the lower extremities                       (1 Point)           [ ] Pregnancy                                                            (1 Point)  [ ] Varicose veins                                               (1 Point)                  [ ] Post-partum < 6 weeks                                      (1 Point)             [x ] BMI > 25 Kg/m2                                            (1 Point)                  [ ] Hormonal therapy or oral contraception       (1 Point)                 [ ] Sepsis (in the previous month)                        (1 Point)             [ ] History of pregnancy complications                (1Point)  [ ] Pneumonia or serious lung disease                                             [ ] Unexplained or recurrent  (=/>3), premature                                 (In the previous month)                               (1 Point)                birth with toxemia or growth-restricted infant (1 Point)  [ ] Abnormal pulmonary function test            (1 Point)                                   SURGERY RELATED RISK FACTORS  [ ] Acute myocardial infarction                       (1 Point)                  [ ]  Section                                         (1 Point)  [ ] Congestive heart failure (in the previous month) (1 Point)   [ ] Minor surgery   lasting <45 minutes       (1 Point)   [ ] Inflammatory bowel disease                             (1 Point)          [ ] Arthroscopic surgery                                  (2 Points)  [ ] Central venous access                                    (2 Points)            [x ] General surgery lasting >45 minutes      (2 Points)       [ ] Stroke (in the previous month)                  (5 Points)            [ ] Elective major lower extremity arthroplasty (5 Points)                                   [  ] Malignancy (present or past include skin melanoma                                          but exclude  basal skin cell)    (2 points)                                      TRAUMA RELATED RISK FACTORS                HEMATOLOGY RELATED FACTORS                                  [ ] Fracture of the hip, pelvis, or leg                       (5 Points)  [ ] Prior episodes of VTE                                     (3 Points)          [ ] Acute spinal cord injury (in the previous month)  (5 Points)  [ ] Positive family history for VTE                         (3 Points)       [ ] Paralysis (less than 1 month)                          (5 Points)  [ ] Prothrombin 28489 A                                      (3 Points)         [ ] Multiple Trauma (within 1month)                 (5Points)                                                                                                                                                                [ ] Factor V Leiden                                          (3 Points)                                OTHER RISK FACTORS                          [ ] Lupus anticoagulants                                     (3 Points)                       [ ] BMI > 40                          (1 Point)                                                         [ ] Anticardiolipin antibodies                                (3 Points)                   [ ] Smoking                              (1Point)                                                [ ] High homocysteine in the blood                      (3 Points)                [  ] Diabetes requiring insulin (1point)                         [ ] Other congenital or acquired thrombophilia       (3 Points)          [  ] Chemotherapy                   (1 Point)  [ ] Heparin induced thrombocytopenia                  (3 Points)             [  ] Blood Transfusion                (1 point)                                                                                                             Total Score [6]                                                                                                                                                                                                                                     IMPROVE Bleeding Risk Score: 1.5    Falls Risk:   High (  )  Mod (  )  Low ( x )    EBL: 50  ml  crcl: 82.8  19 pt seen at bedside on 2north oob in chair.  Discussed her options with anticoagulation with aspirin or Lovenox inj.  Pt states she does not want the Lovenox because she had it before and she dose not want to give herself inj. and the last time she had her rt  shoulder replaced in march of this year she was on an aspirin.   Prefers the aspirin. Risks and benefits discussed.     Vital Signs Last 24 Hrs  T(C): 36.7 (2019 17:29), Max: 37.3 (2019 07:01)  T(F): 98 (2019 17:29), Max: 99.2 (2019 07:01)  HR: 98 (2019 17:29) (77 - 98)  BP: 123/73 (2019 17:29) (115/61 - 174/94)  BP(mean): --  RR: 18 (2019 17:29) (13 - 20)  SpO2: 98% (2019 17:29) (96% - 100%)  FAMILY HISTORY:  Family history of cerebrovascular accident (CVA) in mother (Mother)  Family history of heart disease (Father, Mother)    Denies any personal or familial history of clotting or bleeding disorders.    Allergies    No Known Allergies    Intolerances    oxycodone (Nausea; Stomach Upset)      REVIEW OF SYSTEMS    (  )Fever	     (  )Constipation	(  )SOB				(  )Headache	(  )Dysuria  (  )Chills	     (  )Melena	(  )Dyspnea present on exertion	                    (  )Dizziness                    (  )Polyuria  (  )Nausea	     (  )Hematochezia	(  )Cough			                    (  )Syncope   	(  )Hematuria  (  )Vomiting    (  )Chest Pain	(  )Wheezing			( x )Weakness  (  )Diarrhea     (  )Palpitations	(  )Anorexia			(x  )Myalgia    Pertinent positives in HPI and daily subjective.  All other ROS negative.      PHYSICAL EXAM:    Constitutional: Appears Well    Neurological: A& O x 3    Skin: Warm    Respiratory and Thorax: normal effort; Breath sounds: normal; No rales/wheezing/rhonchi  	  Cardiovascular: S1, S2, regular, NMBR	    Gastrointestinal: BS + x 4Q, nontender	    Genitourinary:  Bladder nondistended, nontender    Musculoskeletal:   General Right:   no muscle/joint tenderness,   normal tone, no joint swelling,   ROM: full	    General Left:   no muscle/joint tenderness,   normal tone, no joint swelling,   ROM: limited      Shoulder:  left: Drsing CDI; Sling/immob. noted; Cap refill good; Radial pulse +; Sensation intact, states she cannot lift her hand                       Lower extrems:   Right: no calf tenderness              negative tru's sign               + pedal pulses    Left:   no calf tenderness              negative tru's sign               + pedal pulses                          9.0    14.93 )-----------( 431      ( 2019 11:35 )             30.6       07-12    141  |  108  |  20  ----------------------------<  131<H>  4.0   |  28  |  0.67    Ca    8.7      2019 11:35        PT/INR - ( 2019 06:53 )   PT: 11.0 sec;   INR: 0.99 ratio         				    MEDICATIONS  (STANDING):  aspirin enteric coated 325 milliGRAM(s) Oral two times a day  ATENolol  Tablet 50 milliGRAM(s) Oral at bedtime  atorvastatin Oral Tab/Cap - Peds 10 milliGRAM(s) Oral daily  ceFAZolin   IVPB 2000 milliGRAM(s) IV Intermittent every 8 hours  docusate sodium 100 milliGRAM(s) Oral three times a day  lactated ringers. 1000 milliLiter(s) IV Continuous <Continuous>  losartan 100 milliGRAM(s) Oral daily  pantoprazole    Tablet 40 milliGRAM(s) Oral before breakfast  polyethylene glycol 3350 17 Gram(s) Oral daily    DVT Prophylaxis:  LMWH                   (  )  Heparin SQ           (  )  Coumadin             (  )  Xarelto                  (  )  Eliquis                   (  )  Venodynes           ( x )  Ambulation          ( x )  UFH                       (  )  Contraindicated  (  )  EC Aspirin             ( x )

## 2019-07-12 NOTE — PROGRESS NOTE ADULT - SUBJECTIVE AND OBJECTIVE BOX
Orthopedic Post-op Check    POD 0 Left Reverse Total Shoulder Arthroplasty  Pain well controlled. Wiggling fingers well.  She reports she had mild nausea after eating but denies vomiting.     Vital Signs Last 24 Hrs  T(C): 36.7 (07-12-19 @ 17:29), Max: 37.3 (07-12-19 @ 07:01)  T(F): 98 (07-12-19 @ 17:29), Max: 99.2 (07-12-19 @ 07:01)  HR: 98 (07-12-19 @ 17:29) (77 - 98)  BP: 123/73 (07-12-19 @ 17:29) (115/61 - 174/94)  BP(mean): --  RR: 18 (07-12-19 @ 17:29) (13 - 20)  SpO2: 98% (07-12-19 @ 17:29) (96% - 100%)                        9.0    14.93 )-----------( 431      ( 12 Jul 2019 11:35 )             30.6     12 Jul 2019 11:35    141    |  108    |  20     ----------------------------<  131    4.0     |  28     |  0.67     Ca    8.7        12 Jul 2019 11:35      PT/INR - ( 12 Jul 2019 06:53 )   PT: 11.0 sec;   INR: 0.99 ratio           NAD AAOx3  Dressing Clean and dry   Shoulder Immobilizer in place  AROM: Intact AIN PIN, wrist ext/fex.  SILT all 5 digits  2+ Radial Pulse      A/P:  Stable POD 0 Left Reverse Total Shoulder Arthroplasty  -Analgesia   -ppx ABX  -DVT PE ppx  -OOB ambulate with PT   -Incentive spirometry   -ROM ER to 20, Ir to chest wall, full flexion, no extension

## 2019-07-12 NOTE — DISCHARGE NOTE PROVIDER - NSDCFUADDINST_GEN_ALL_CORE_FT
Total Shoulder Discharge Instructions     1. Activity: No Aggressive ROM until cleared by Dr. Loja. Maintain sling at all times EXCEPT to straighten elbow a few times daily. Elevate hand and wiggle fingers. Do not start any outpatient PT until you see Dr. Loja in the office.    2. Call with fever over 101, wound redness, drainage.    3. Wound care: Do not remove or change dressing unless saturated.  IF so, apply dry gauze, tegaderm. Be careful not to removed mesh that is glued to the wound. There are no sutures or staples to remove.    4. Continue to apply ICE packs.    5. DVT PE Prophylaxis: ECASA 325. See med Rec.    6. Follow Up: Orthopedics, Dr. LOJA  10-14 days in office. Call to schedule. If going home, eRx sent to your pharmacy for .

## 2019-07-12 NOTE — CONSULT NOTE ADULT - SUBJECTIVE AND OBJECTIVE BOX
c/c: left shoulder pain    HPI: 79F, pmh of HTN, HLD, Spinal stenosis, OA, Fibromyalgia, Polio at age 4, who is admitted for elective left shoulder replacement after failing outpt conservative measures. Hospitalist service consulted for medical comanagement. Pt seen and examined on 2N. Feels well. Received nerve block, has numbness LUE. No sob/chest pain. hasn't eaten yet, just not hungry. Tolerating po fluids. No sob/chest pain. No nausea/vomiting. Got up to go to bathroom, felt a little dizzy. normally takes bp meds at night.    ROS: all 10 systems reviewed and is as above otherwise negative.     PMH: as above  PSH: right knee replacement, right knee revision, b/l cataract, left hand surgery, right shoulder replacement, appendectomy  F/H: father-heart disease, mother-stroke and heart disease  Social: no tobacco use, has 1-2 drinks a day  Allergies: NKDA  Home meds: see med rec    Vital Signs Last 24 Hrs  T(C): 36.8 (12 Jul 2019 13:00), Max: 37.3 (12 Jul 2019 07:01)  T(F): 98.3 (12 Jul 2019 13:00), Max: 99.2 (12 Jul 2019 07:01)  HR: 91 (12 Jul 2019 13:00) (77 - 91)  BP: 132/63 (12 Jul 2019 13:00) (115/61 - 174/94)  RR: 18 (12 Jul 2019 13:00) (13 - 20)  SpO2: 98% (12 Jul 2019 13:00) (96% - 100%)    PHYSICAL EXAM:    GENERAL: Comfortable, no acute distress   HEAD:  Normocephalic, atraumatic  EYES: EOMI, PERRLA  HEENT: Moist mucous membranes  NECK: Supple, No JVD  NERVOUS SYSTEM:  Alert & Oriented X3, numbness to LUE from block.  CHEST/LUNG: Clear to auscultation bilaterally  HEART: Regular rate and rhythm  ABDOMEN: Soft, Nontender, Nondistended, Bowel sounds present  GENITOURINARY: Voiding, no palpable bladder  EXTREMITIES:   No clubbing, cyanosis, or edema  MUSCULOSKELETAL-LUE in sling, dressing intact  SKIN-no rash    LABS:                        9.0    14.93 )-----------( 431      ( 12 Jul 2019 11:35 )             30.6     07-12    141  |  108  |  20  ----------------------------<  131<H>  4.0   |  28  |  0.67    Ca    8.7      12 Jul 2019 11:35      PT/INR - ( 12 Jul 2019 06:53 )   PT: 11.0 sec;   INR: 0.99 ratio      MEDICATIONS  (STANDING):  ATENolol  Tablet 50 milliGRAM(s) Oral at bedtime  atorvastatin Oral Tab/Cap - Peds 10 milliGRAM(s) Oral daily  ceFAZolin   IVPB 2000 milliGRAM(s) IV Intermittent every 8 hours  docusate sodium 100 milliGRAM(s) Oral three times a day  lactated ringers. 1000 milliLiter(s) (75 mL/Hr) IV Continuous <Continuous>  losartan 100 milliGRAM(s) Oral daily  pantoprazole    Tablet 40 milliGRAM(s) Oral before breakfast  polyethylene glycol 3350 17 Gram(s) Oral daily    MEDICATIONS  (PRN):  acetaminophen   Tablet .. 650 milliGRAM(s) Oral every 6 hours PRN Temp greater or equal to 38C (100.4F), Mild Pain (1 - 3)  aluminum hydroxide/magnesium hydroxide/simethicone Suspension 30 milliLiter(s) Oral four times a day PRN Indigestion  benzocaine 15 mG/menthol 3.6 mG Lozenge 1 Lozenge Oral every 2 hours PRN Sore Throat  magnesium hydroxide Suspension 30 milliLiter(s) Oral daily PRN Constipation  ondansetron Injectable 4 milliGRAM(s) IV Push every 6 hours PRN Nausea and/or Vomiting  senna 2 Tablet(s) Oral at bedtime PRN Constipation  traMADol 25 milliGRAM(s) Oral every 4 hours PRN Moderate Pain (4 - 6)  traMADol 50 milliGRAM(s) Oral every 4 hours PRN Severe Pain (7 - 10)    ASSESSMENT AND PLAN:  79F, pmh as above a/w:    1. Left shoulder OA s/p elective left shoulder replacement:  -POD#0  -pain controlled  -does not tolerate oxycodone/dilaudid  -tramadol or iv tylenol for pain  -physical therapy  -incentive spirometry  -bowel regimen.     2. HTN:  -continue bb/arb with hold parameters.     3. HLD:  -statin    4. DVT px  -f/u a/c svc recs c/c: left shoulder pain    HPI: 79F, pmh of HTN, HLD, Spinal stenosis, OA, Fibromyalgia, Polio at age 4, who is admitted for elective left shoulder replacement after failing outpt conservative measures. Hospitalist service consulted for medical comanagement. Pt seen and examined on 2N. Feels well. Received nerve block, has numbness LUE. No sob/chest pain. hasn't eaten yet, just not hungry. Tolerating po fluids. No sob/chest pain. No nausea/vomiting. Got up to go to bathroom, felt a little dizzy. normally takes bp meds at night.    ROS: all 10 systems reviewed and is as above otherwise negative.     PMH: as above  PSH: right knee replacement, right knee revision, b/l cataract, left hand surgery, right shoulder replacement, appendectomy  F/H: father-heart disease, mother-stroke and heart disease  Social: no tobacco use, has 1-2 drinks a day  Allergies: NKDA  Home meds: see med rec    Vital Signs Last 24 Hrs  T(C): 36.8 (12 Jul 2019 13:00), Max: 37.3 (12 Jul 2019 07:01)  T(F): 98.3 (12 Jul 2019 13:00), Max: 99.2 (12 Jul 2019 07:01)  HR: 91 (12 Jul 2019 13:00) (77 - 91)  BP: 132/63 (12 Jul 2019 13:00) (115/61 - 174/94)  RR: 18 (12 Jul 2019 13:00) (13 - 20)  SpO2: 98% (12 Jul 2019 13:00) (96% - 100%)    PHYSICAL EXAM:    GENERAL: Comfortable, no acute distress   HEAD:  Normocephalic, atraumatic  EYES: EOMI, PERRLA  HEENT: Moist mucous membranes  NECK: Supple, No JVD  NERVOUS SYSTEM:  Alert & Oriented X3, numbness to LUE from block.  CHEST/LUNG: Clear to auscultation bilaterally  HEART: Regular rate and rhythm  ABDOMEN: Soft, Nontender, Nondistended, Bowel sounds present  GENITOURINARY: Voiding, no palpable bladder  EXTREMITIES:   No clubbing, cyanosis, or edema  MUSCULOSKELETAL-LUE in sling, dressing intact  SKIN-no rash    LABS:                        9.0    14.93 )-----------( 431      ( 12 Jul 2019 11:35 )             30.6     07-12    141  |  108  |  20  ----------------------------<  131<H>  4.0   |  28  |  0.67    Ca    8.7      12 Jul 2019 11:35      PT/INR - ( 12 Jul 2019 06:53 )   PT: 11.0 sec;   INR: 0.99 ratio      MEDICATIONS  (STANDING):  ATENolol  Tablet 50 milliGRAM(s) Oral at bedtime  atorvastatin Oral Tab/Cap - Peds 10 milliGRAM(s) Oral daily  ceFAZolin   IVPB 2000 milliGRAM(s) IV Intermittent every 8 hours  docusate sodium 100 milliGRAM(s) Oral three times a day  lactated ringers. 1000 milliLiter(s) (75 mL/Hr) IV Continuous <Continuous>  losartan 100 milliGRAM(s) Oral daily  pantoprazole    Tablet 40 milliGRAM(s) Oral before breakfast  polyethylene glycol 3350 17 Gram(s) Oral daily    MEDICATIONS  (PRN):  acetaminophen   Tablet .. 650 milliGRAM(s) Oral every 6 hours PRN Temp greater or equal to 38C (100.4F), Mild Pain (1 - 3)  aluminum hydroxide/magnesium hydroxide/simethicone Suspension 30 milliLiter(s) Oral four times a day PRN Indigestion  benzocaine 15 mG/menthol 3.6 mG Lozenge 1 Lozenge Oral every 2 hours PRN Sore Throat  magnesium hydroxide Suspension 30 milliLiter(s) Oral daily PRN Constipation  ondansetron Injectable 4 milliGRAM(s) IV Push every 6 hours PRN Nausea and/or Vomiting  senna 2 Tablet(s) Oral at bedtime PRN Constipation  traMADol 25 milliGRAM(s) Oral every 4 hours PRN Moderate Pain (4 - 6)  traMADol 50 milliGRAM(s) Oral every 4 hours PRN Severe Pain (7 - 10)    ASSESSMENT AND PLAN:  79F, pmh as above a/w:    1. Left shoulder OA s/p elective left shoulder replacement:  -POD#0  -pain controlled  -does not tolerate oxycodone/dilaudid  -tramadol or iv tylenol for pain  -physical therapy  -incentive spirometry  -bowel regimen.     2. HTN:  -continue bb/arb with hold parameters.     3. HLD:  -statin    4. DVT px  -f/u a/c svc recs    thank you , will follow

## 2019-07-12 NOTE — DISCHARGE NOTE PROVIDER - NSDCCPCAREPLAN_GEN_ALL_CORE_FT
PRINCIPAL DISCHARGE DIAGNOSIS  Diagnosis: Osteoarthritis of left shoulder  Assessment and Plan of Treatment:

## 2019-07-12 NOTE — DISCHARGE NOTE PROVIDER - NSDCCPTREATMENT_GEN_ALL_CORE_FT
PRINCIPAL PROCEDURE  Procedure: Open reverse total shoulder replacement  Findings and Treatment:
Dr Pennington

## 2019-07-12 NOTE — CONSULT NOTE ADULT - ASSESSMENT
This is a 79 year old female s/p left tsr on 7-12-19.  Pt has high thrombosis risk and requires anticoagulation prophylaxis.     Plan:  Enteric coated ASA 325mg PO twice aday x 14 days  Daily CBC/BMP  Venodynes  Enc ambulation  Thank you for this consult, will sign off, please reconsult if needed

## 2019-07-13 ENCOUNTER — TRANSCRIPTION ENCOUNTER (OUTPATIENT)
Age: 79
End: 2019-07-13

## 2019-07-13 LAB
ANION GAP SERPL CALC-SCNC: 10 MMOL/L — SIGNIFICANT CHANGE UP (ref 5–17)
BUN SERPL-MCNC: 17 MG/DL — SIGNIFICANT CHANGE UP (ref 7–23)
CALCIUM SERPL-MCNC: 9.2 MG/DL — SIGNIFICANT CHANGE UP (ref 8.5–10.1)
CHLORIDE SERPL-SCNC: 102 MMOL/L — SIGNIFICANT CHANGE UP (ref 96–108)
CO2 SERPL-SCNC: 26 MMOL/L — SIGNIFICANT CHANGE UP (ref 22–31)
CREAT SERPL-MCNC: 0.71 MG/DL — SIGNIFICANT CHANGE UP (ref 0.5–1.3)
GLUCOSE SERPL-MCNC: 143 MG/DL — HIGH (ref 70–99)
HCT VFR BLD CALC: 30 % — LOW (ref 34.5–45)
HGB BLD-MCNC: 8.9 G/DL — LOW (ref 11.5–15.5)
MCHC RBC-ENTMCNC: 20.4 PG — LOW (ref 27–34)
MCHC RBC-ENTMCNC: 29.7 GM/DL — LOW (ref 32–36)
MCV RBC AUTO: 68.6 FL — LOW (ref 80–100)
PLATELET # BLD AUTO: 452 K/UL — HIGH (ref 150–400)
POTASSIUM SERPL-MCNC: 4 MMOL/L — SIGNIFICANT CHANGE UP (ref 3.5–5.3)
POTASSIUM SERPL-SCNC: 4 MMOL/L — SIGNIFICANT CHANGE UP (ref 3.5–5.3)
RBC # BLD: 4.37 M/UL — SIGNIFICANT CHANGE UP (ref 3.8–5.2)
RBC # FLD: 19.7 % — HIGH (ref 10.3–14.5)
SODIUM SERPL-SCNC: 138 MMOL/L — SIGNIFICANT CHANGE UP (ref 135–145)
WBC # BLD: 14.55 K/UL — HIGH (ref 3.8–10.5)
WBC # FLD AUTO: 14.55 K/UL — HIGH (ref 3.8–10.5)

## 2019-07-13 RX ORDER — TRAMADOL HYDROCHLORIDE 50 MG/1
1 TABLET ORAL
Qty: 42 | Refills: 0
Start: 2019-07-13 | End: 2019-07-19

## 2019-07-13 RX ORDER — ASPIRIN/CALCIUM CARB/MAGNESIUM 324 MG
1 TABLET ORAL
Qty: 60 | Refills: 0
Start: 2019-07-13 | End: 2019-08-11

## 2019-07-13 RX ADMIN — Medication 100 MILLIGRAM(S): at 00:08

## 2019-07-13 RX ADMIN — TRAMADOL HYDROCHLORIDE 50 MILLIGRAM(S): 50 TABLET ORAL at 12:40

## 2019-07-13 RX ADMIN — TRAMADOL HYDROCHLORIDE 50 MILLIGRAM(S): 50 TABLET ORAL at 00:16

## 2019-07-13 RX ADMIN — TRAMADOL HYDROCHLORIDE 50 MILLIGRAM(S): 50 TABLET ORAL at 05:13

## 2019-07-13 RX ADMIN — TRAMADOL HYDROCHLORIDE 50 MILLIGRAM(S): 50 TABLET ORAL at 00:46

## 2019-07-13 RX ADMIN — Medication 325 MILLIGRAM(S): at 05:15

## 2019-07-13 RX ADMIN — Medication 100 MILLIGRAM(S): at 05:15

## 2019-07-13 RX ADMIN — PANTOPRAZOLE SODIUM 40 MILLIGRAM(S): 20 TABLET, DELAYED RELEASE ORAL at 05:15

## 2019-07-13 RX ADMIN — TRAMADOL HYDROCHLORIDE 50 MILLIGRAM(S): 50 TABLET ORAL at 05:43

## 2019-07-13 RX ADMIN — TRAMADOL HYDROCHLORIDE 50 MILLIGRAM(S): 50 TABLET ORAL at 11:49

## 2019-07-13 NOTE — PROGRESS NOTE ADULT - ASSESSMENT
A/P: 79F s/p Left Reverse Total Shoulder Arthroplasty  Analgesia  DVT ppx  PT  NWB LUE  LUE ROM ER to 20, Ir to chest wall, full flexion, no extension  DC planning-Plan home today

## 2019-07-13 NOTE — PROGRESS NOTE ADULT - SUBJECTIVE AND OBJECTIVE BOX
Pt S/E at bedside, no acute events overnight, pain controlled    Vital Signs Last 24 Hrs  T(C): 36.8 (12 Jul 2019 23:30), Max: 36.8 (12 Jul 2019 13:00)  T(F): 98.2 (12 Jul 2019 23:30), Max: 98.3 (12 Jul 2019 13:00)  HR: 92 (12 Jul 2019 23:30) (77 - 98)  BP: 124/77 (12 Jul 2019 23:30) (115/61 - 174/94)  BP(mean): --  RR: 18 (12 Jul 2019 23:30) (13 - 20)  SpO2: 98% (12 Jul 2019 23:30) (96% - 100%)    Gen: NAD, AAOx3    Left Upper Extremity:  Dressing clean dry intact, In sling  +AIN/PIN/M/R/U/Msc/Ax  SILT C5-T1  +Radial Pulse  Compartments soft  No calf TTP B/L

## 2019-07-13 NOTE — PROGRESS NOTE ADULT - SUBJECTIVE AND OBJECTIVE BOX
c/c: left shoulder pain    HPI: 79F, pmh of HTN, HLD, Spinal stenosis, OA, Fibromyalgia, Polio at age 4, who is admitted for elective left shoulder replacement after failing outpt conservative measures. Hospitalist service consulted for medical comanagement.     7/13 pt seen and examined this am. Was doing well. pain controlled with tramadol. no sob/chest pain. Ambulating without difficulty.    ROS: all 10 systems reviewed and is as above otherwise negative.     Vital Signs Last 24 Hrs  T(C): 36.8 (12 Jul 2019 23:30), Max: 36.8 (12 Jul 2019 23:30)  T(F): 98.2 (12 Jul 2019 23:30), Max: 98.2 (12 Jul 2019 23:30)  HR: 92 (12 Jul 2019 23:30) (92 - 98)  BP: 124/77 (12 Jul 2019 23:30) (123/73 - 124/77)  RR: 18 (12 Jul 2019 23:30) (18 - 18)  SpO2: 98% (12 Jul 2019 23:30) (98% - 98%)    PHYSICAL EXAM:    GENERAL: Comfortable, no acute distress   HEAD:  Normocephalic, atraumatic  EYES: EOMI, PERRLA  HEENT: Moist mucous membranes  NECK: Supple, No JVD  NERVOUS SYSTEM:  Alert & Oriented X3, numbness to LUE from block.  CHEST/LUNG: Clear to auscultation bilaterally  HEART: Regular rate and rhythm  ABDOMEN: Soft, Nontender, Nondistended, Bowel sounds present  GENITOURINARY: Voiding, no palpable bladder  EXTREMITIES:   No clubbing, cyanosis, or edema  MUSCULOSKELETAL -LUE in sling, dressing intact  SKIN-no rash    LABS:                        8.9    14.55 )-----------( 452      ( 13 Jul 2019 08:27 )             30.0     07-13    138  |  102  |  17  ----------------------------<  143<H>  4.0   |  26  |  0.71    Ca    9.2      13 Jul 2019 08:27      PT/INR - ( 12 Jul 2019 06:53 )   PT: 11.0 sec;   INR: 0.99 ratio           MEDICATIONS  (STANDING):  ATENolol  Tablet 50 milliGRAM(s) Oral at bedtime  atorvastatin Oral Tab/Cap - Peds 10 milliGRAM(s) Oral daily  ceFAZolin   IVPB 2000 milliGRAM(s) IV Intermittent every 8 hours  docusate sodium 100 milliGRAM(s) Oral three times a day  lactated ringers. 1000 milliLiter(s) (75 mL/Hr) IV Continuous <Continuous>  losartan 100 milliGRAM(s) Oral daily  pantoprazole    Tablet 40 milliGRAM(s) Oral before breakfast  polyethylene glycol 3350 17 Gram(s) Oral daily    MEDICATIONS  (PRN):  acetaminophen   Tablet .. 650 milliGRAM(s) Oral every 6 hours PRN Temp greater or equal to 38C (100.4F), Mild Pain (1 - 3)  aluminum hydroxide/magnesium hydroxide/simethicone Suspension 30 milliLiter(s) Oral four times a day PRN Indigestion  benzocaine 15 mG/menthol 3.6 mG Lozenge 1 Lozenge Oral every 2 hours PRN Sore Throat  magnesium hydroxide Suspension 30 milliLiter(s) Oral daily PRN Constipation  ondansetron Injectable 4 milliGRAM(s) IV Push every 6 hours PRN Nausea and/or Vomiting  senna 2 Tablet(s) Oral at bedtime PRN Constipation  traMADol 25 milliGRAM(s) Oral every 4 hours PRN Moderate Pain (4 - 6)  traMADol 50 milliGRAM(s) Oral every 4 hours PRN Severe Pain (7 - 10)    ASSESSMENT AND PLAN:  79F, pmh as above a/w:    1. Left shoulder OA s/p elective left shoulder replacement:  -POD#1  -pain controlled  -physical therapy  -incentive spirometry  -bowel regimen.     2. HTN:  -continue bb/arb with hold parameters.     3. HLD:  -statin    4. Dispo:  home today

## 2019-07-13 NOTE — DISCHARGE NOTE NURSING/CASE MANAGEMENT/SOCIAL WORK - NSDCDPATPORTLINK_GEN_ALL_CORE
You can access the [facility], offered by [facility], by registering with the following website: [email or web address]

## 2019-07-13 NOTE — PHYSICAL THERAPY INITIAL EVALUATION ADULT - ACTIVE RANGE OF MOTION EXAMINATION, REHAB EVAL
Left UE not fully assessed secondary to pain at this time./Right LE Active ROM was WFL (within functional limits)/Right UE Active ROM was WFL (within functional limits)/Left LE Active ROM was WFL (within functional limits)

## 2019-07-15 LAB — SURGICAL PATHOLOGY STUDY: SIGNIFICANT CHANGE UP

## 2019-07-18 DIAGNOSIS — E78.5 HYPERLIPIDEMIA, UNSPECIFIED: ICD-10-CM

## 2019-07-18 DIAGNOSIS — M19.012 PRIMARY OSTEOARTHRITIS, LEFT SHOULDER: ICD-10-CM

## 2019-07-18 DIAGNOSIS — E78.00 PURE HYPERCHOLESTEROLEMIA, UNSPECIFIED: ICD-10-CM

## 2019-07-18 DIAGNOSIS — M79.7 FIBROMYALGIA: ICD-10-CM

## 2019-07-18 DIAGNOSIS — G25.81 RESTLESS LEGS SYNDROME: ICD-10-CM

## 2019-07-18 DIAGNOSIS — E66.9 OBESITY, UNSPECIFIED: ICD-10-CM

## 2019-07-18 DIAGNOSIS — I10 ESSENTIAL (PRIMARY) HYPERTENSION: ICD-10-CM

## 2019-07-18 DIAGNOSIS — M48.00 SPINAL STENOSIS, SITE UNSPECIFIED: ICD-10-CM

## 2019-07-21 NOTE — PATIENT PROFILE ADULT - HAVE YOU EXPERIENCED VIOLENCE OR A TRAUMATIC EVENT?
[facility] Medicine Event Note    Patient's wife stating patient has increased "confusion" and "hallucinating" overnight.  Patient is A&Ox3, NAD  Vital Signs Last 24 Hrs  T(C): 36.4 (21 Jul 2019 12:05), Max: 36.6 (20 Jul 2019 21:03)  T(F): 97.5 (21 Jul 2019 12:05), Max: 97.8 (20 Jul 2019 21:03)  HR: 66 (21 Jul 2019 12:05) (66 - 73)  BP: 105/63 (21 Jul 2019 12:05) (105/63 - 147/78)  BP(mean): --  RR: 18 (21 Jul 2019 12:05) (18 - 18)  SpO2: 100% (21 Jul 2019 12:05) (98% - 100%)  PHYSICAL EXAM:  GENERAL: Laying down, in no acute distress  PSYCH: AAOx3  HEAD:  Atraumatic, Normocephalic  EYES: EOMI, PERRLA, conjunctiva and sclera clear  NECK: Supple, No JVD  CHEST/LUNG: Breath sounds clear to auscultation bilaterally.  No wheezes, rales, rhonchi or crackles  HEART: +S1/S2.  Regular rate and rhythm.  No murmurs, rubs or gallops  ABDOMEN: Bowel sounds present in all 4 quadrants.  Soft, Nontender, Nondistended  EXTREMITIES: No edema or erythema noted in bilateral lower extremities  NEUROLOGY: Cranial nerves 2-12 grossly intact  SKIN: No rashes or lesions    Assessment: 74yo male with pmhx of DM, HTN, CVA (partial R eye blindness), CAD s/p stents p/w dizziness    Plan:  - Dr. Burnett made aware.  -Neuro following patient  -pending MRI brain  -continue to monitor    Rose Bergeron PA-C  Dept  of Medicine    #[phone] no

## 2019-10-24 NOTE — PATIENT PROFILE ADULT - NSPROPTRIGHTREPPHONE_GEN_A_NUR
You should receive call to schedule the MRI. If have not been called to schedule within a couple of days, please call [phone] to schedule. After MRI is scheduled, please call our office and schedule follow-up appointment for 2-3 days after the date of the MRI.
same

## 2021-04-14 NOTE — PROGRESS NOTE ADULT - ASSESSMENT
78yo F s/p rTSA, now POD 1  H/H Stable, FU AM Labs  Continue Periop ABx x24h  Pain Control: IV/PO Opioids  DVT PPx: SCDs, ASA  PT/OT: rTSA precautions - FF to neutral IR to chest wall ER to neutral  no PROM  Rest, Ice, Elevate  Incentive Spirometry, OOB  D/c planning
uvula midline, no vesicles, no redness, and no oropharyngeal exudate.

## 2021-06-21 NOTE — PATIENT PROFILE ADULT - NSPROPTRIGHTCAREGIVER_GEN_A_NUR
"[facility] (Boys, 2-20 Years) weight-for-age data using vitals from 6/21/2021.  72 %ile (Z= 0.57) based on [facility] (Boys, 2-20 Years) Stature-for-age declines

## 2021-06-25 NOTE — PROGRESS NOTE ADULT - PROVIDER SPECIALTY LIST ADULT
Anesthesia Pt alert and oriented x4, ambulates independently. Assessment completed, see flowsheet. Denies pain or discomfort at this time.

## 2021-07-12 NOTE — H&P PST ADULT - BACK
Daily Note     Today's date: 2021  Patient name: [facility]  : 1969  MRN: [id number]  Referring provider: TONY Sánchez  Dx:   Encounter Diagnosis     ICD-10-CM    1  Chronic right-sided low back pain with right-sided sciatica  M54 41     G89 29        Subjective: Patient reports decreased low back pain since last tx session, no episodes of right LE pain  Objective: See treatment diary below      Assessment: Pt demonstrated increased tolerance to core stabilization poc, moderate tone at R QL  Plan: Continue per plan of care        Precautions: HTN     Manuals 6/15 6/21 6/23 6/28 6/30 7/7 7/12   RLE - Hams/Calf/Piri/  IT Band ML JM np       Graston to R > L lumbar paraspinals   8 min 10 min 10 min 10 min 10 min   Hamstring stretch   3x10" 3 x10"  3x10" 3x10" 3x10"             Neuro Re-Ed           MTP/LTP          PPT  5"x10 Abdominal brace  5"x10 Abdominal brace 5"x10 Abdominal brace 5"x15 Abdominal brace 5"x15 Abdominal brace 5"x15   PPT w/march  x10 ea w/ Abdominal brace  2x10 ea x10ea 3x10 ea 3x15 ea 3x20 ea   PPT w/SLR  x10 ea w/ Abdominal brace  2x10 ea w/ Abdominal brace  2x10 ea w/ Abdominal brace  2x10 ea w/ Abdominal brace  3x10 ea w/ Abdominal brace  3x12 ea   PPT w/knee fallout  x10 ea 1x10 2x10 2x10     bridges   2x10 2x10 3x10 3x12 3x15   Postural Ed ML         Ther Ex          Bike Level 1 8 mins 5 mins        SLR x 3          Bridges  x20        Hip Add w/Ball  5"x10        Lori Hip Flex  5"x10        Hooklying Heel Walk          LTR          SKTC  5"x10 5"x10 5"x10 5"x10 5"x10 5"x10   S/L Hip Abd          Clamshells   YTB/  3x10 YTB 3x10  YTB/  3x15 YTB/  3x20 YTB/  3x20   Ther Activity          Lifting & Carrying                    Gait Training                              Modalities          HP/CP prn
No deformity or limitation of movement

## 2021-07-30 NOTE — H&P PST ADULT - NS SC CAGE ALCOHOL EYE OPENER
[FreeTextEntry1] : Independently reviewed the following imaging:\par - CT chest, abdo, pelvis w/ IVC on 7/21/21 no

## 2021-09-24 NOTE — ED ADULT NURSE NOTE - RESPIRATORY ASSESSMENT
UDS was received from patient and reviewed prior to administration of medication.    Patient givenSublocade 100mg subcutaneous to RLQ of abdomen  VSS stable after 10  Minutes and discharged to home after seen by MD  Sublocade:    RX:[id number]  Lot #[id number]  Exp: 10-22  SN: [id number]       WDL

## 2022-06-05 NOTE — PHYSICAL THERAPY INITIAL EVALUATION ADULT - ASSISTIVE DEVICE FOR TRANSFER: GAIT, REHAB EVAL
Speaking Coherently 150 ft w/o AD with Trendelenburg gait, then given a SC w/ improved stability/straight cane

## 2022-07-09 ENCOUNTER — NON-APPOINTMENT (OUTPATIENT)
Age: 82
End: 2022-07-09

## 2022-07-20 NOTE — PATIENT PROFILE ADULT - INDICATE TYPE
Health Care Proxy (HCP)
Patient requests all Lab, Cardiology, and Radiology Results on their Discharge Instructions

## 2023-01-03 PROBLEM — J30.2 OTHER SEASONAL ALLERGIC RHINITIS: Chronic | Status: ACTIVE | Noted: 2019-07-02

## 2023-01-03 PROBLEM — R01.1 CARDIAC MURMUR, UNSPECIFIED: Chronic | Status: ACTIVE | Noted: 2019-07-02

## 2023-01-03 PROBLEM — G25.81 RESTLESS LEGS SYNDROME: Chronic | Status: ACTIVE | Noted: 2019-07-02

## 2023-01-30 ENCOUNTER — RESULT CHARGE (OUTPATIENT)
Age: 83
End: 2023-01-30

## 2023-01-30 ENCOUNTER — APPOINTMENT (OUTPATIENT)
Dept: ORTHOPEDIC SURGERY | Facility: CLINIC | Age: 83
End: 2023-01-30
Payer: MEDICARE

## 2023-01-30 DIAGNOSIS — M16.12 UNILATERAL PRIMARY OSTEOARTHRITIS, LEFT HIP: ICD-10-CM

## 2023-01-30 DIAGNOSIS — M16.11 UNILATERAL PRIMARY OSTEOARTHRITIS, RIGHT HIP: ICD-10-CM

## 2023-01-30 DIAGNOSIS — G14 POSTPOLIO SYNDROME: ICD-10-CM

## 2023-01-30 PROCEDURE — 99214 OFFICE O/P EST MOD 30 MIN: CPT

## 2023-01-30 PROCEDURE — 73564 X-RAY EXAM KNEE 4 OR MORE: CPT | Mod: LT

## 2023-01-30 RX ORDER — VALSARTAN 160 MG/1
160 TABLET, COATED ORAL
Qty: 90 | Refills: 0 | Status: ACTIVE | COMMUNITY
Start: 2022-02-02

## 2023-01-31 PROBLEM — M16.11 PRIMARY OSTEOARTHRITIS OF RIGHT HIP: Status: ACTIVE | Noted: 2023-01-30

## 2023-01-31 PROBLEM — M16.12 PRIMARY OSTEOARTHRITIS OF LEFT HIP: Status: ACTIVE | Noted: 2023-01-30

## 2023-01-31 PROBLEM — G14 POST-POLIO SYNDROME: Status: ACTIVE | Noted: 2023-01-30

## 2023-01-31 NOTE — DISCUSSION/SUMMARY
[de-identified] : Plan is to proceed with a left total knee replacement.  All risk benefits alternatives of the procedure were discussed the patient detail she wished to proceed.

## 2023-01-31 NOTE — ASSESSMENT
[FreeTextEntry1] : Patient comes in today follow-up on her left knee.  She has progressive worsening pain in the left knee consistent with severe arthritis.  She has pain at night.  She has been walking with a cane she has pain with distances pain with stairs and has had a progressive valgus deformity with lateral joint line pain.

## 2023-01-31 NOTE — IMAGING
[de-identified] : Examination of the left lower extremity reveals a normal neurovascular exam.  Examination left knee reveals limited range of motion from 0 to 125 degrees with lateral joint line pain and crepitation.  There is a 1+ effusion.  There is a valgus deformity.  There is no redness rashes or visible scars.  Examination of the left hip is normal.

## 2023-01-31 NOTE — DATA REVIEWED
[FreeTextEntry1] : X-rays done in the office today of the left knee 4 views weightbearing show severe bone-on-bone arthritis of the lateral compartment with valgus deformity.  There is bone-on-bone changes and subluxation of the patella on the lateral facet.  There were no tumors masses or calcifications seen.

## 2023-01-31 NOTE — HISTORY OF PRESENT ILLNESS
[Left Leg] : left leg [7] : 7 [3] : 3 [Dull/Aching] : dull/aching [Sharp] : sharp [Frequent] : frequent [Household chores] : household chores [Rest] : rest [Standing] : standing [Walking] : walking [Bending forward] : bending forward [Stairs] : stairs [Lying in bed] : lying in bed [] : yes [de-identified] : New injury to left knee [FreeTextEntry2] : NA chronic [FreeTextEntry6] : pain differs depending on use [FreeTextEntry8] : When moving around.

## 2023-03-08 ENCOUNTER — RESULT REVIEW (OUTPATIENT)
Age: 83
End: 2023-03-08

## 2023-03-08 ENCOUNTER — OUTPATIENT (OUTPATIENT)
Dept: OUTPATIENT SERVICES | Facility: HOSPITAL | Age: 83
LOS: 1 days | End: 2023-03-08
Payer: MEDICARE

## 2023-03-08 VITALS
TEMPERATURE: 98 F | OXYGEN SATURATION: 99 % | HEART RATE: 76 BPM | HEIGHT: 60 IN | DIASTOLIC BLOOD PRESSURE: 70 MMHG | RESPIRATION RATE: 16 BRPM | SYSTOLIC BLOOD PRESSURE: 138 MMHG | WEIGHT: 169.98 LBS

## 2023-03-08 DIAGNOSIS — Z96.651 PRESENCE OF RIGHT ARTIFICIAL KNEE JOINT: Chronic | ICD-10-CM

## 2023-03-08 DIAGNOSIS — Z01.818 ENCOUNTER FOR OTHER PREPROCEDURAL EXAMINATION: ICD-10-CM

## 2023-03-08 DIAGNOSIS — Z96.612 PRESENCE OF LEFT ARTIFICIAL SHOULDER JOINT: Chronic | ICD-10-CM

## 2023-03-08 DIAGNOSIS — Z90.49 ACQUIRED ABSENCE OF OTHER SPECIFIED PARTS OF DIGESTIVE TRACT: Chronic | ICD-10-CM

## 2023-03-08 DIAGNOSIS — Z98.890 OTHER SPECIFIED POSTPROCEDURAL STATES: Chronic | ICD-10-CM

## 2023-03-08 DIAGNOSIS — Z96.659 PRESENCE OF UNSPECIFIED ARTIFICIAL KNEE JOINT: Chronic | ICD-10-CM

## 2023-03-08 DIAGNOSIS — Z98.49 CATARACT EXTRACTION STATUS, UNSPECIFIED EYE: Chronic | ICD-10-CM

## 2023-03-08 DIAGNOSIS — Z96.611 PRESENCE OF RIGHT ARTIFICIAL SHOULDER JOINT: Chronic | ICD-10-CM

## 2023-03-08 LAB
ALBUMIN SERPL ELPH-MCNC: 3.4 G/DL — SIGNIFICANT CHANGE UP (ref 3.3–5)
ALP SERPL-CCNC: 116 U/L — SIGNIFICANT CHANGE UP (ref 40–120)
ALT FLD-CCNC: 24 U/L — SIGNIFICANT CHANGE UP (ref 12–78)
ANION GAP SERPL CALC-SCNC: 5 MMOL/L — SIGNIFICANT CHANGE UP (ref 5–17)
AST SERPL-CCNC: 21 U/L — SIGNIFICANT CHANGE UP (ref 15–37)
BASOPHILS # BLD AUTO: 0.02 K/UL — SIGNIFICANT CHANGE UP (ref 0–0.2)
BASOPHILS NFR BLD AUTO: 0.3 % — SIGNIFICANT CHANGE UP (ref 0–2)
BILIRUB SERPL-MCNC: 0.4 MG/DL — SIGNIFICANT CHANGE UP (ref 0.2–1.2)
BLD GP AB SCN SERPL QL: SIGNIFICANT CHANGE UP
BUN SERPL-MCNC: 20 MG/DL — SIGNIFICANT CHANGE UP (ref 7–23)
CALCIUM SERPL-MCNC: 9.3 MG/DL — SIGNIFICANT CHANGE UP (ref 8.5–10.1)
CHLORIDE SERPL-SCNC: 106 MMOL/L — SIGNIFICANT CHANGE UP (ref 96–108)
CO2 SERPL-SCNC: 26 MMOL/L — SIGNIFICANT CHANGE UP (ref 22–31)
CREAT SERPL-MCNC: 0.8 MG/DL — SIGNIFICANT CHANGE UP (ref 0.5–1.3)
EGFR: 73 ML/MIN/1.73M2 — SIGNIFICANT CHANGE UP
EOSINOPHIL # BLD AUTO: 0.11 K/UL — SIGNIFICANT CHANGE UP (ref 0–0.5)
EOSINOPHIL NFR BLD AUTO: 1.4 % — SIGNIFICANT CHANGE UP (ref 0–6)
GLUCOSE SERPL-MCNC: 113 MG/DL — HIGH (ref 70–99)
HCT VFR BLD CALC: 38.1 % — SIGNIFICANT CHANGE UP (ref 34.5–45)
HGB BLD-MCNC: 12.4 G/DL — SIGNIFICANT CHANGE UP (ref 11.5–15.5)
IMM GRANULOCYTES NFR BLD AUTO: 0.6 % — SIGNIFICANT CHANGE UP (ref 0–0.9)
INR BLD: 0.99 RATIO — SIGNIFICANT CHANGE UP (ref 0.88–1.16)
LYMPHOCYTES # BLD AUTO: 1.49 K/UL — SIGNIFICANT CHANGE UP (ref 1–3.3)
LYMPHOCYTES # BLD AUTO: 18.6 % — SIGNIFICANT CHANGE UP (ref 13–44)
MCHC RBC-ENTMCNC: 25.7 PG — LOW (ref 27–34)
MCHC RBC-ENTMCNC: 32.5 GM/DL — SIGNIFICANT CHANGE UP (ref 32–36)
MCV RBC AUTO: 78.9 FL — LOW (ref 80–100)
MONOCYTES # BLD AUTO: 0.59 K/UL — SIGNIFICANT CHANGE UP (ref 0–0.9)
MONOCYTES NFR BLD AUTO: 7.4 % — SIGNIFICANT CHANGE UP (ref 2–14)
NEUTROPHILS # BLD AUTO: 5.74 K/UL — SIGNIFICANT CHANGE UP (ref 1.8–7.4)
NEUTROPHILS NFR BLD AUTO: 71.7 % — SIGNIFICANT CHANGE UP (ref 43–77)
PLATELET # BLD AUTO: 398 K/UL — SIGNIFICANT CHANGE UP (ref 150–400)
POTASSIUM SERPL-MCNC: 3.8 MMOL/L — SIGNIFICANT CHANGE UP (ref 3.5–5.3)
POTASSIUM SERPL-SCNC: 3.8 MMOL/L — SIGNIFICANT CHANGE UP (ref 3.5–5.3)
PROT SERPL-MCNC: 7.2 GM/DL — SIGNIFICANT CHANGE UP (ref 6–8.3)
PROTHROM AB SERPL-ACNC: 11.5 SEC — SIGNIFICANT CHANGE UP (ref 10.5–13.4)
RBC # BLD: 4.83 M/UL — SIGNIFICANT CHANGE UP (ref 3.8–5.2)
RBC # FLD: 17 % — HIGH (ref 10.3–14.5)
SODIUM SERPL-SCNC: 137 MMOL/L — SIGNIFICANT CHANGE UP (ref 135–145)
WBC # BLD: 8 K/UL — SIGNIFICANT CHANGE UP (ref 3.8–10.5)
WBC # FLD AUTO: 8 K/UL — SIGNIFICANT CHANGE UP (ref 3.8–10.5)

## 2023-03-08 PROCEDURE — 86901 BLOOD TYPING SEROLOGIC RH(D): CPT

## 2023-03-08 PROCEDURE — 80053 COMPREHEN METABOLIC PANEL: CPT

## 2023-03-08 PROCEDURE — 93010 ELECTROCARDIOGRAM REPORT: CPT

## 2023-03-08 PROCEDURE — 83036 HEMOGLOBIN GLYCOSYLATED A1C: CPT

## 2023-03-08 PROCEDURE — 71046 X-RAY EXAM CHEST 2 VIEWS: CPT

## 2023-03-08 PROCEDURE — 87640 STAPH A DNA AMP PROBE: CPT

## 2023-03-08 PROCEDURE — 93005 ELECTROCARDIOGRAM TRACING: CPT

## 2023-03-08 PROCEDURE — 87641 MR-STAPH DNA AMP PROBE: CPT

## 2023-03-08 PROCEDURE — 99214 OFFICE O/P EST MOD 30 MIN: CPT | Mod: 25

## 2023-03-08 PROCEDURE — 71046 X-RAY EXAM CHEST 2 VIEWS: CPT | Mod: 26

## 2023-03-08 PROCEDURE — 86850 RBC ANTIBODY SCREEN: CPT

## 2023-03-08 PROCEDURE — 86900 BLOOD TYPING SEROLOGIC ABO: CPT

## 2023-03-08 PROCEDURE — 36415 COLL VENOUS BLD VENIPUNCTURE: CPT

## 2023-03-08 PROCEDURE — 85025 COMPLETE CBC W/AUTO DIFF WBC: CPT

## 2023-03-08 PROCEDURE — 85610 PROTHROMBIN TIME: CPT

## 2023-03-08 RX ORDER — LOSARTAN POTASSIUM 100 MG/1
2 TABLET, FILM COATED ORAL
Qty: 0 | Refills: 0 | DISCHARGE

## 2023-03-08 NOTE — H&P PST ADULT - NSANTHSNORERD_ENT_A_CORE
No adverse/ allergic reaction noted from injections. Respirations regular and easy. Denies pain. Released in satisfactory condition. Ambulates out. Gait steady.
No

## 2023-03-08 NOTE — H&P PST ADULT - ASSESSMENT
Plan:  1. PST instructions given ; NPO status/  instructions to be given by ASU   2. Pt instructed to take following meds on day of surgery:   3. Pt instructed to take routine evening medications unless indicated   4. Stop NSAIDS ( Aspirin Alev Motrin Mobic Diclofenac), herbal supplements , MVI , Vitamin fish oil 7 days prior to surgery  unless   directed by surgeon or cardiologist;   5. Medical Optimization  with    6. EZ wash instructions given & mupirocin instructions given  7. Labs EKG CXR as per surgeon request   8. Pt instructed to self quarantine after Covid test   9. Covid Testing scheduled Pt notified and aware  10. Pt denies covid symptoms shortness of breath fever cough       CAPRINI SCORE [CLOT]  AGE RELATED RISK FACTORS                                                       MOBILITY RELATED FACTORS  [ ] Age 41-60 years                                            (1 Point)                  [ ] Bed rest                                                        (1 Point)  [ ] Age: 61-74 years                                           (2 Points)                 [ ] Plaster cast                                                   (2 Points)  [x ] Age= 75 years                                              (3 Points)                 [ ] Bed bound for more than 72 hours                 (2 Points)    DISEASE RELATED RISK FACTORS                                               GENDER SPECIFIC FACTORS  [ ] Edema in the lower extremities                       (1 Point)                  [ ] Pregnancy                                                     (1 Point)  [ ] Varicose veins                                               (1 Point)                  [ ] Post-partum < 6 weeks                                   (1 Point)             [x ] BMI > 25 Kg/m2                                            (1 Point)                  [ ] Hormonal therapy  or oral contraception          (1 Point)                 [ ] Sepsis (in the previous month)                        (1 Point)                  [ ] History of pregnancy complications                 (1 point)  [ ] Pneumonia or serious lung disease                                               [ ] Unexplained or recurrent                     (1 Point)           (in the previous month)                               (1 Point)  [ ] Abnormal pulmonary function test                     (1 Point)                 SURGERY RELATED RISK FACTORS  [ ] Acute myocardial infarction                              (1 Point)                 [ ]  Section                                             (1 Point)  [ ] Congestive heart failure (in the previous month)  (1 Point)               [ ] Minor surgery                                                  (1 Point)   [ ] Inflammatory bowel disease                             (1 Point)                 [ ] Arthroscopic surgery                                        (2 Points)  [ ] Central venous access                                      (2 Points)                [ ] General surgery lasting more than 45 minutes   (2 Points)       [ ] Stroke (in the previous month)                          (5 Points)               [ x ] Elective arthroplasty                                         (5 Points)     ()  malignancy                                                             (2 points )                                                                                                                                      HEMATOLOGY RELATED FACTORS                                                 TRAUMA RELATED RISK FACTORS  [ ] Prior episodes of VTE                                     (3 Points)                 [ ] Fracture of the hip, pelvis, or leg                       (5 Points)  [ ] Positive family history for VTE                         (3 Points)                 [ ] Acute spinal cord injury (in the previous month)  (5 Points)  [ ] Prothrombin 37018 A                                     (3 Points)                 [ ] Paralysis  (less than 1 month)                             (5 Points)  [ ] Factor V Leiden                                             (3 Points)                  [ ] Multiple Trauma within 1 month                        (5 Points)  [ ] Lupus anticoagulants                                     (3 Points)                                                           [ ] Anticardiolipin antibodies                               (3 Points)                                                       [ ] High homocysteine in the blood                      (3 Points)                                             [ ] Other congenital or acquired thrombophilia      (3 Points)                                                [ ] Heparin induced thrombocytopenia                  (3 Points)    (  ) Malignancy                                        Total Score [   9       ]     83 year old female diagnosed with OA left knee c/o chronic left knee pain and swelling; Pain is dull ache 5/10 with ROM; uses knee brace and cane which helps with mobility;  she presents to PST for planned Left TKR  Plan:  1. PST instructions given ; NPO status/  instructions to be given by ASU   2. Pt instructed to take following meds on day of surgery:   3. Pt instructed to take routine evening medications unless indicated   4. Stop NSAIDS ( Aspirin Alev Motrin Mobic Diclofenac), herbal supplements , MVI , Vitamin fish oil 7 days prior to surgery  unless   directed by surgeon or cardiologist;   5. Medical Optimization  with    6. EZ wash instructions given & mupirocin instructions given  7. Labs EKG CXR as per surgeon request   8. Pt instructed to self quarantine after Covid test   9. Covid Testing scheduled Pt notified and aware  10. Pt denies covid symptoms shortness of breath fever cough   11. PATIENT WILL REQUIRE A WALKER POST-OPERATIVELY FOR TOTAL JOINT REPLACEMENT SURGERY DUE TO OSTEOARTHRITIS OF KNEE       CAPRINI SCORE [CLOT]  AGE RELATED RISK FACTORS                                                       MOBILITY RELATED FACTORS  [ ] Age 41-60 years                                            (1 Point)                  [ ] Bed rest                                                        (1 Point)  [ ] Age: 61-74 years                                           (2 Points)                 [ ] Plaster cast                                                   (2 Points)  [x ] Age= 75 years                                              (3 Points)                 [ ] Bed bound for more than 72 hours                 (2 Points)    DISEASE RELATED RISK FACTORS                                               GENDER SPECIFIC FACTORS  [ ] Edema in the lower extremities                       (1 Point)                  [ ] Pregnancy                                                     (1 Point)  [ ] Varicose veins                                               (1 Point)                  [ ] Post-partum < 6 weeks                                   (1 Point)             [x ] BMI > 25 Kg/m2                                            (1 Point)                  [ ] Hormonal therapy  or oral contraception          (1 Point)                 [ ] Sepsis (in the previous month)                        (1 Point)                  [ ] History of pregnancy complications                 (1 point)  [ ] Pneumonia or serious lung disease                                               [ ] Unexplained or recurrent                     (1 Point)           (in the previous month)                               (1 Point)  [ ] Abnormal pulmonary function test                     (1 Point)                 SURGERY RELATED RISK FACTORS  [ ] Acute myocardial infarction                              (1 Point)                 [ ]  Section                                             (1 Point)  [ ] Congestive heart failure (in the previous month)  (1 Point)               [ ] Minor surgery                                                  (1 Point)   [ ] Inflammatory bowel disease                             (1 Point)                 [ ] Arthroscopic surgery                                        (2 Points)  [ ] Central venous access                                      (2 Points)                [ ] General surgery lasting more than 45 minutes   (2 Points)       [ ] Stroke (in the previous month)                          (5 Points)               [ x ] Elective arthroplasty                                         (5 Points)     ()  malignancy                                                             (2 points )                                                                                                                                      HEMATOLOGY RELATED FACTORS                                                 TRAUMA RELATED RISK FACTORS  [ ] Prior episodes of VTE                                     (3 Points)                 [ ] Fracture of the hip, pelvis, or leg                       (5 Points)  [ ] Positive family history for VTE                         (3 Points)                 [ ] Acute spinal cord injury (in the previous month)  (5 Points)  [ ] Prothrombin 94522 A                                     (3 Points)                 [ ] Paralysis  (less than 1 month)                             (5 Points)  [ ] Factor V Leiden                                             (3 Points)                  [ ] Multiple Trauma within 1 month                        (5 Points)  [ ] Lupus anticoagulants                                     (3 Points)                                                           [ ] Anticardiolipin antibodies                               (3 Points)                                                       [ ] High homocysteine in the blood                      (3 Points)                                             [ ] Other congenital or acquired thrombophilia      (3 Points)                                                [ ] Heparin induced thrombocytopenia                  (3 Points)    (  ) Malignancy                                        Total Score [   9       ]

## 2023-03-08 NOTE — H&P PST ADULT - NSICDXFAMILYHX_GEN_ALL_CORE_FT
FAMILY HISTORY:  Father  Still living? Unknown  Family history of heart disease, Age at diagnosis: Age Unknown    Mother  Still living? Unknown  Family history of cerebrovascular accident (CVA) in mother, Age at diagnosis: Age Unknown  Family history of heart disease, Age at diagnosis: Age Unknown FAMILY HISTORY:  Father  Still living? Unknown  Family history of heart disease, Age at diagnosis: Age Unknown    Mother  Still living? Unknown  Family history of cerebrovascular accident (CVA) in mother, Age at diagnosis: Age Unknown  Family history of heart disease, Age at diagnosis: Age Unknown

## 2023-03-08 NOTE — H&P PST ADULT - NSICDXPASTSURGICALHX_GEN_ALL_CORE_FT
PAST SURGICAL HISTORY:  H/O total knee replacement, right 2014    History of cataract surgery b/l 2006 2008    History of hand surgery left hand 2016    History of revision of total knee arthroplasty right knee 2014    S/P appendectomy 2004    S/P shoulder replacement, right 3/2019 PAST SURGICAL HISTORY:  H/O total knee replacement, right 2014    History of cataract surgery b/l 2006 2008    History of hand surgery left hand 2016    History of left shoulder replacement     History of revision of total knee arthroplasty right knee 2014    S/P appendectomy 2004    S/P shoulder replacement, right 3/2019

## 2023-03-08 NOTE — H&P PST ADULT - NSICDXPASTMEDICALHX_GEN_ALL_CORE_FT
PAST MEDICAL HISTORY:  Heart murmur     HTN (hypertension)     Hypercholesterolemia     OA (osteoarthritis) of shoulder     Obesity     Restless legs syndrome (RLS)     Seasonal allergies     Spondylolisthesis of lumbar region PAST MEDICAL HISTORY:  Heart murmur     HTN (hypertension)     Hypercholesterolemia     OA (osteoarthritis) of shoulder     Obesity     Polio     Post poliomyelitis syndrome     Restless legs syndrome (RLS)     Seasonal allergies     Spondylolisthesis of lumbar region      PAST MEDICAL HISTORY:  Heart murmur     HTN (hypertension)     Hypercholesterolemia     OA (osteoarthritis) of shoulder     Obesity     Polio     Post poliomyelitis syndrome     Restless legs syndrome (RLS)     Seasonal allergies     Spondylolisthesis     Spondylolisthesis of lumbar region

## 2023-03-08 NOTE — H&P PST ADULT - HISTORY OF PRESENT ILLNESS
78 y/o female with right shoulder osteoarthritis. Complain of chronic right shoulder pain. Takes NSAID with mild pain relief. Scheduled for Reverse left  total shoulder replacement. 83 year old female diagnosed with OA left knee c/o chronic left knee pain and swelling; Pain is dull ache 5/10 with ROM; uses knee brace and cane which helps with mobility;  she presents to PST for planned Left TKR

## 2023-03-09 DIAGNOSIS — Z01.818 ENCOUNTER FOR OTHER PREPROCEDURAL EXAMINATION: ICD-10-CM

## 2023-03-09 PROBLEM — M43.10 SPONDYLOLISTHESIS, SITE UNSPECIFIED: Chronic | Status: ACTIVE | Noted: 2023-03-08

## 2023-03-09 PROBLEM — A80.9 ACUTE POLIOMYELITIS, UNSPECIFIED: Chronic | Status: ACTIVE | Noted: 2023-03-08

## 2023-03-09 PROBLEM — G14 POSTPOLIO SYNDROME: Chronic | Status: ACTIVE | Noted: 2023-03-08

## 2023-03-09 LAB
A1C WITH ESTIMATED AVERAGE GLUCOSE RESULT: 5.9 % — HIGH (ref 4–5.6)
ESTIMATED AVERAGE GLUCOSE: 123 MG/DL — HIGH (ref 68–114)
MRSA PCR RESULT.: SIGNIFICANT CHANGE UP
S AUREUS DNA NOSE QL NAA+PROBE: DETECTED

## 2023-03-09 NOTE — CHART NOTE - NSCHARTNOTEFT_GEN_A_CORE
Patient positive for MSSA; Instructed pt to start mupirocin on 3/19-3/23/2023 . Pt instructed to read card with instructions for mupirocin use; Pt understood instructions with read back.

## 2023-03-21 ENCOUNTER — LABORATORY RESULT (OUTPATIENT)
Age: 83
End: 2023-03-21

## 2023-03-23 RX ORDER — HYDROMORPHONE HYDROCHLORIDE 2 MG/ML
0.5 INJECTION INTRAMUSCULAR; INTRAVENOUS; SUBCUTANEOUS EVERY 4 HOURS
Refills: 0 | Status: DISCONTINUED | OUTPATIENT
Start: 2023-03-24 | End: 2023-03-25

## 2023-03-24 ENCOUNTER — TRANSCRIPTION ENCOUNTER (OUTPATIENT)
Age: 83
End: 2023-03-24

## 2023-03-24 ENCOUNTER — APPOINTMENT (OUTPATIENT)
Dept: ORTHOPEDIC SURGERY | Facility: HOSPITAL | Age: 83
End: 2023-03-24

## 2023-03-24 ENCOUNTER — INPATIENT (INPATIENT)
Facility: HOSPITAL | Age: 83
LOS: 0 days | Discharge: HOME HEALTH | DRG: 470 | End: 2023-03-25
Attending: ORTHOPAEDIC SURGERY | Admitting: ORTHOPAEDIC SURGERY
Payer: MEDICARE

## 2023-03-24 ENCOUNTER — RESULT REVIEW (OUTPATIENT)
Age: 83
End: 2023-03-24

## 2023-03-24 VITALS
RESPIRATION RATE: 16 BRPM | WEIGHT: 169.98 LBS | HEIGHT: 60 IN | SYSTOLIC BLOOD PRESSURE: 133 MMHG | OXYGEN SATURATION: 100 % | TEMPERATURE: 98 F | HEART RATE: 71 BPM | DIASTOLIC BLOOD PRESSURE: 81 MMHG

## 2023-03-24 VITALS
DIASTOLIC BLOOD PRESSURE: 58 MMHG | SYSTOLIC BLOOD PRESSURE: 98 MMHG | OXYGEN SATURATION: 96 % | TEMPERATURE: 98 F | HEART RATE: 89 BPM | RESPIRATION RATE: 18 BRPM

## 2023-03-24 DIAGNOSIS — Z96.651 PRESENCE OF RIGHT ARTIFICIAL KNEE JOINT: Chronic | ICD-10-CM

## 2023-03-24 DIAGNOSIS — Z98.890 OTHER SPECIFIED POSTPROCEDURAL STATES: Chronic | ICD-10-CM

## 2023-03-24 DIAGNOSIS — Z96.611 PRESENCE OF RIGHT ARTIFICIAL SHOULDER JOINT: Chronic | ICD-10-CM

## 2023-03-24 DIAGNOSIS — Z96.659 PRESENCE OF UNSPECIFIED ARTIFICIAL KNEE JOINT: Chronic | ICD-10-CM

## 2023-03-24 DIAGNOSIS — Z98.49 CATARACT EXTRACTION STATUS, UNSPECIFIED EYE: Chronic | ICD-10-CM

## 2023-03-24 DIAGNOSIS — Z90.49 ACQUIRED ABSENCE OF OTHER SPECIFIED PARTS OF DIGESTIVE TRACT: Chronic | ICD-10-CM

## 2023-03-24 DIAGNOSIS — M17.12 UNILATERAL PRIMARY OSTEOARTHRITIS, LEFT KNEE: ICD-10-CM

## 2023-03-24 DIAGNOSIS — Z96.612 PRESENCE OF LEFT ARTIFICIAL SHOULDER JOINT: Chronic | ICD-10-CM

## 2023-03-24 LAB
ANION GAP SERPL CALC-SCNC: 4 MMOL/L — LOW (ref 5–17)
BUN SERPL-MCNC: 21 MG/DL — SIGNIFICANT CHANGE UP (ref 7–23)
CALCIUM SERPL-MCNC: 9 MG/DL — SIGNIFICANT CHANGE UP (ref 8.5–10.1)
CHLORIDE SERPL-SCNC: 109 MMOL/L — HIGH (ref 96–108)
CO2 SERPL-SCNC: 27 MMOL/L — SIGNIFICANT CHANGE UP (ref 22–31)
CREAT SERPL-MCNC: 0.86 MG/DL — SIGNIFICANT CHANGE UP (ref 0.5–1.3)
EGFR: 67 ML/MIN/1.73M2 — SIGNIFICANT CHANGE UP
GLUCOSE BLDC GLUCOMTR-MCNC: 104 MG/DL — HIGH (ref 70–99)
GLUCOSE BLDC GLUCOMTR-MCNC: 112 MG/DL — HIGH (ref 70–99)
GLUCOSE SERPL-MCNC: 126 MG/DL — HIGH (ref 70–99)
HCT VFR BLD CALC: 34.5 % — SIGNIFICANT CHANGE UP (ref 34.5–45)
HGB BLD-MCNC: 11.1 G/DL — LOW (ref 11.5–15.5)
MCHC RBC-ENTMCNC: 25.5 PG — LOW (ref 27–34)
MCHC RBC-ENTMCNC: 32.2 GM/DL — SIGNIFICANT CHANGE UP (ref 32–36)
MCV RBC AUTO: 79.3 FL — LOW (ref 80–100)
PLATELET # BLD AUTO: 397 K/UL — SIGNIFICANT CHANGE UP (ref 150–400)
POTASSIUM SERPL-MCNC: 4 MMOL/L — SIGNIFICANT CHANGE UP (ref 3.5–5.3)
POTASSIUM SERPL-SCNC: 4 MMOL/L — SIGNIFICANT CHANGE UP (ref 3.5–5.3)
RBC # BLD: 4.35 M/UL — SIGNIFICANT CHANGE UP (ref 3.8–5.2)
RBC # FLD: 17.3 % — HIGH (ref 10.3–14.5)
SODIUM SERPL-SCNC: 140 MMOL/L — SIGNIFICANT CHANGE UP (ref 135–145)
WBC # BLD: 9.97 K/UL — SIGNIFICANT CHANGE UP (ref 3.8–10.5)
WBC # FLD AUTO: 9.97 K/UL — SIGNIFICANT CHANGE UP (ref 3.8–10.5)

## 2023-03-24 PROCEDURE — 73560 X-RAY EXAM OF KNEE 1 OR 2: CPT | Mod: 26,LT

## 2023-03-24 PROCEDURE — 99221 1ST HOSP IP/OBS SF/LOW 40: CPT

## 2023-03-24 PROCEDURE — 97162 PT EVAL MOD COMPLEX 30 MIN: CPT | Mod: GP

## 2023-03-24 PROCEDURE — 73560 X-RAY EXAM OF KNEE 1 OR 2: CPT | Mod: LT

## 2023-03-24 PROCEDURE — 97530 THERAPEUTIC ACTIVITIES: CPT | Mod: GP

## 2023-03-24 PROCEDURE — 82962 GLUCOSE BLOOD TEST: CPT

## 2023-03-24 PROCEDURE — C1713: CPT

## 2023-03-24 PROCEDURE — 88305 TISSUE EXAM BY PATHOLOGIST: CPT

## 2023-03-24 PROCEDURE — 20985 CPTR-ASST DIR MS PX: CPT

## 2023-03-24 PROCEDURE — 97116 GAIT TRAINING THERAPY: CPT | Mod: GP

## 2023-03-24 PROCEDURE — 99223 1ST HOSP IP/OBS HIGH 75: CPT

## 2023-03-24 PROCEDURE — 80048 BASIC METABOLIC PNL TOTAL CA: CPT

## 2023-03-24 PROCEDURE — 27447 TOTAL KNEE ARTHROPLASTY: CPT | Mod: LT

## 2023-03-24 PROCEDURE — 36415 COLL VENOUS BLD VENIPUNCTURE: CPT

## 2023-03-24 PROCEDURE — 88305 TISSUE EXAM BY PATHOLOGIST: CPT | Mod: 26

## 2023-03-24 PROCEDURE — C1776: CPT

## 2023-03-24 PROCEDURE — 85027 COMPLETE CBC AUTOMATED: CPT

## 2023-03-24 RX ORDER — ONDANSETRON 8 MG/1
4 TABLET, FILM COATED ORAL ONCE
Refills: 0 | Status: DISCONTINUED | OUTPATIENT
Start: 2023-03-24 | End: 2023-03-24

## 2023-03-24 RX ORDER — ACETAMINOPHEN 500 MG
1000 TABLET ORAL ONCE
Refills: 0 | Status: DISCONTINUED | OUTPATIENT
Start: 2023-03-24 | End: 2023-03-24

## 2023-03-24 RX ORDER — FOLIC ACID 0.8 MG
1 TABLET ORAL DAILY
Refills: 0 | Status: DISCONTINUED | OUTPATIENT
Start: 2023-03-24 | End: 2023-03-25

## 2023-03-24 RX ORDER — SODIUM CHLORIDE 9 MG/ML
1000 INJECTION, SOLUTION INTRAVENOUS
Refills: 0 | Status: DISCONTINUED | OUTPATIENT
Start: 2023-03-25 | End: 2023-03-25

## 2023-03-24 RX ORDER — POLYETHYLENE GLYCOL 3350 17 G/17G
17 POWDER, FOR SOLUTION ORAL
Qty: 1 | Refills: 0
Start: 2023-03-24

## 2023-03-24 RX ORDER — OXYCODONE HYDROCHLORIDE 5 MG/1
10 TABLET ORAL
Refills: 0 | Status: DISCONTINUED | OUTPATIENT
Start: 2023-03-24 | End: 2023-03-24

## 2023-03-24 RX ORDER — VALSARTAN 80 MG/1
160 TABLET ORAL DAILY
Refills: 0 | Status: DISCONTINUED | OUTPATIENT
Start: 2023-03-24 | End: 2023-03-25

## 2023-03-24 RX ORDER — VALSARTAN 80 MG/1
1 TABLET ORAL
Qty: 0 | Refills: 0 | DISCHARGE

## 2023-03-24 RX ORDER — ATENOLOL 25 MG/1
50 TABLET ORAL AT BEDTIME
Refills: 0 | Status: DISCONTINUED | OUTPATIENT
Start: 2023-03-24 | End: 2023-03-25

## 2023-03-24 RX ORDER — PANTOPRAZOLE SODIUM 20 MG/1
1 TABLET, DELAYED RELEASE ORAL
Qty: 30 | Refills: 0
Start: 2023-03-24 | End: 2023-04-22

## 2023-03-24 RX ORDER — HYDROCHLOROTHIAZIDE 25 MG
1 TABLET ORAL
Qty: 0 | Refills: 0 | DISCHARGE

## 2023-03-24 RX ORDER — ASPIRIN/CALCIUM CARB/MAGNESIUM 324 MG
81 TABLET ORAL EVERY 12 HOURS
Refills: 0 | Status: DISCONTINUED | OUTPATIENT
Start: 2023-03-24 | End: 2023-03-25

## 2023-03-24 RX ORDER — SENNA PLUS 8.6 MG/1
1 TABLET ORAL
Qty: 7 | Refills: 0
Start: 2023-03-24 | End: 2023-03-30

## 2023-03-24 RX ORDER — ATORVASTATIN CALCIUM 80 MG/1
10 TABLET, FILM COATED ORAL AT BEDTIME
Refills: 0 | Status: DISCONTINUED | OUTPATIENT
Start: 2023-03-24 | End: 2023-03-25

## 2023-03-24 RX ORDER — ATORVASTATIN CALCIUM 80 MG/1
1 TABLET, FILM COATED ORAL
Qty: 0 | Refills: 0 | DISCHARGE

## 2023-03-24 RX ORDER — OXYCODONE HYDROCHLORIDE 5 MG/1
1 TABLET ORAL
Qty: 30 | Refills: 0
Start: 2023-03-24 | End: 2023-03-28

## 2023-03-24 RX ORDER — ASPIRIN/CALCIUM CARB/MAGNESIUM 324 MG
1 TABLET ORAL
Qty: 60 | Refills: 0
Start: 2023-03-24 | End: 2023-04-22

## 2023-03-24 RX ORDER — ATENOLOL 25 MG/1
1 TABLET ORAL
Qty: 0 | Refills: 0 | DISCHARGE

## 2023-03-24 RX ORDER — CHOLECALCIFEROL (VITAMIN D3) 125 MCG
1 CAPSULE ORAL
Qty: 0 | Refills: 0 | DISCHARGE

## 2023-03-24 RX ORDER — DEXAMETHASONE 0.5 MG/5ML
8 ELIXIR ORAL ONCE
Refills: 0 | Status: DISCONTINUED | OUTPATIENT
Start: 2023-03-25 | End: 2023-03-25

## 2023-03-24 RX ORDER — ONDANSETRON 8 MG/1
4 TABLET, FILM COATED ORAL EVERY 6 HOURS
Refills: 0 | Status: DISCONTINUED | OUTPATIENT
Start: 2023-03-24 | End: 2023-03-25

## 2023-03-24 RX ORDER — TRANEXAMIC ACID 100 MG/ML
1000 INJECTION, SOLUTION INTRAVENOUS ONCE
Refills: 0 | Status: DISCONTINUED | OUTPATIENT
Start: 2023-03-24 | End: 2023-03-25

## 2023-03-24 RX ORDER — PANTOPRAZOLE SODIUM 20 MG/1
40 TABLET, DELAYED RELEASE ORAL ONCE
Refills: 0 | Status: COMPLETED | OUTPATIENT
Start: 2023-03-24 | End: 2023-03-24

## 2023-03-24 RX ORDER — OXYCODONE HYDROCHLORIDE 5 MG/1
5 TABLET ORAL
Refills: 0 | Status: DISCONTINUED | OUTPATIENT
Start: 2023-03-24 | End: 2023-03-25

## 2023-03-24 RX ORDER — ACETAMINOPHEN 500 MG
2 TABLET ORAL
Qty: 84 | Refills: 0
Start: 2023-03-24 | End: 2023-04-06

## 2023-03-24 RX ORDER — SODIUM CHLORIDE 9 MG/ML
1000 INJECTION, SOLUTION INTRAVENOUS
Refills: 0 | Status: DISCONTINUED | OUTPATIENT
Start: 2023-03-24 | End: 2023-03-24

## 2023-03-24 RX ORDER — FERROUS SULFATE 325(65) MG
325 TABLET ORAL DAILY
Refills: 0 | Status: DISCONTINUED | OUTPATIENT
Start: 2023-03-24 | End: 2023-03-25

## 2023-03-24 RX ORDER — OXYCODONE HYDROCHLORIDE 5 MG/1
10 TABLET ORAL
Refills: 0 | Status: DISCONTINUED | OUTPATIENT
Start: 2023-03-24 | End: 2023-03-25

## 2023-03-24 RX ORDER — ACETAMINOPHEN 500 MG
975 TABLET ORAL EVERY 8 HOURS
Refills: 0 | Status: DISCONTINUED | OUTPATIENT
Start: 2023-03-24 | End: 2023-03-25

## 2023-03-24 RX ORDER — SENNA PLUS 8.6 MG/1
2 TABLET ORAL AT BEDTIME
Refills: 0 | Status: DISCONTINUED | OUTPATIENT
Start: 2023-03-24 | End: 2023-03-25

## 2023-03-24 RX ORDER — MAGNESIUM HYDROXIDE 400 MG/1
30 TABLET, CHEWABLE ORAL DAILY
Refills: 0 | Status: DISCONTINUED | OUTPATIENT
Start: 2023-03-24 | End: 2023-03-25

## 2023-03-24 RX ORDER — HYDROMORPHONE HYDROCHLORIDE 2 MG/ML
0.5 INJECTION INTRAMUSCULAR; INTRAVENOUS; SUBCUTANEOUS
Refills: 0 | Status: DISCONTINUED | OUTPATIENT
Start: 2023-03-24 | End: 2023-03-24

## 2023-03-24 RX ORDER — TRAMADOL HYDROCHLORIDE 50 MG/1
25 TABLET ORAL EVERY 6 HOURS
Refills: 0 | Status: DISCONTINUED | OUTPATIENT
Start: 2023-03-24 | End: 2023-03-25

## 2023-03-24 RX ORDER — CEFAZOLIN SODIUM 1 G
2000 VIAL (EA) INJECTION EVERY 8 HOURS
Refills: 0 | Status: COMPLETED | OUTPATIENT
Start: 2023-03-24 | End: 2023-03-25

## 2023-03-24 RX ORDER — CELECOXIB 200 MG/1
200 CAPSULE ORAL EVERY 12 HOURS
Refills: 0 | Status: DISCONTINUED | OUTPATIENT
Start: 2023-03-25 | End: 2023-03-25

## 2023-03-24 RX ORDER — POLYETHYLENE GLYCOL 3350 17 G/17G
17 POWDER, FOR SOLUTION ORAL AT BEDTIME
Refills: 0 | Status: DISCONTINUED | OUTPATIENT
Start: 2023-03-24 | End: 2023-03-25

## 2023-03-24 RX ADMIN — POLYETHYLENE GLYCOL 3350 17 GRAM(S): 17 POWDER, FOR SOLUTION ORAL at 21:29

## 2023-03-24 RX ADMIN — SENNA PLUS 2 TABLET(S): 8.6 TABLET ORAL at 21:29

## 2023-03-24 RX ADMIN — PANTOPRAZOLE SODIUM 40 MILLIGRAM(S): 20 TABLET, DELAYED RELEASE ORAL at 10:22

## 2023-03-24 RX ADMIN — TRAMADOL HYDROCHLORIDE 25 MILLIGRAM(S): 50 TABLET ORAL at 17:41

## 2023-03-24 RX ADMIN — Medication 975 MILLIGRAM(S): at 21:29

## 2023-03-24 RX ADMIN — Medication 100 MILLIGRAM(S): at 21:28

## 2023-03-24 RX ADMIN — TRAMADOL HYDROCHLORIDE 25 MILLIGRAM(S): 50 TABLET ORAL at 18:35

## 2023-03-24 RX ADMIN — ATORVASTATIN CALCIUM 10 MILLIGRAM(S): 80 TABLET, FILM COATED ORAL at 21:40

## 2023-03-24 NOTE — PROGRESS NOTE ADULT - SUBJECTIVE AND OBJECTIVE BOX
Postop Check    Patient tolerated the procedure well. Patient seen and examined at bedside.  No acute complaints at this time. Pain well controlled. Denies chest pain, shortness of breath, nausea or vomiting.     PE:  Vital Signs Last 24 Hrs  T(C): 36.3 (03-24-23 @ 14:45), Max: 36.4 (03-24-23 @ 10:03)  T(F): 97.3 (03-24-23 @ 14:45), Max: 97.6 (03-24-23 @ 10:03)  HR: 66 (03-24-23 @ 15:30) (66 - 77)  BP: 114/46 (03-24-23 @ 15:30) (92/44 - 133/81)  BP(mean): --  RR: 17 (03-24-23 @ 15:30) (12 - 22)  SpO2: 100% (03-24-23 @ 15:30) (98% - 100%)    General: NAD, resting comfortably in bed  LLE:   Dressing C/D/I  SCDs present bilaterally  Compartments soft and compressible  No calf tenderness bilaterally  +TA/EHL/FHL/GSC  SILT L3-S1  + DP/PT                            11.1   9.97  )-----------( 397      ( 24 Mar 2023 13:59 )             34.5     03-24    140  |  109<H>  |  21  ----------------------------<  126<H>  4.0   |  27  |  0.86    Ca    9.0      24 Mar 2023 13:59          A/P:  83y f s/p L TKA POD0    -PT/OT   -WBAT on the b/l LEs  -Pain Control  -DVT ppx per AC team recs   -Continue perioperative abx x 24 hours  -FU AM Labs  -Rest, ice, compress and elevate the extremity as we needed  -Incentive Spirometry  -Medical management appreciated  -Dispo pending PT Eval

## 2023-03-24 NOTE — DISCHARGE NOTE NURSING/CASE MANAGEMENT/SOCIAL WORK - NSDCVIVACCINE_GEN_ALL_CORE_FT
Tdap; 24-Apr-2019 13:43; Vijaya Bobo (RN); [employer]; [id number] (Exp. Date: 26-Feb-2021); IntraMuscular; Deltoid Left.; 0.5 milliLiter(s); VIS (VIS Published: 09-May-2013, VIS Presented: 24-Apr-2019);

## 2023-03-24 NOTE — PHYSICAL THERAPY INITIAL EVALUATION ADULT - PHYSICAL ASSIST/NONPHYSICAL ASSIST: GAIT, REHAB EVAL
cues to practice 3 pt step- TO gait on this encounter due to L knee feeling a bit wobbly post-op rather than step-THRU (to avoid increasing FLEXION moment at L knee during gait cycle )/verbal cues/nonverbal cues (demo/gestures)/1 person assist

## 2023-03-24 NOTE — DISCHARGE NOTE PROVIDER - HOSPITAL COURSE
H&P:  Pt is a83y Female     Now s/p Left Total Knee Arthroplasty. Pt is afebrile with stable vital signs. Pain is controlled. Alert and Oriented. Exam intact EHL FHL TA GS, +DP. Dressing is clean and dry.    Hospital Course:  Patient presented to [facility] medically cleared for elective Knee Replacement Surgery, having failed outpatient conservative management. Prophylactic antibiotics were started before the procedure and continued for 24 hours. Pt received an Adductor canal block in the OR for analgesia per anesthesia protocol. They were admitted after surgery to the orthopedic floor.   There were no complications during the hospital stay. All home medications were continued.     Routine consults were obtained from the Anticoagulation Team for DVT/PE prophylaxis, from Physical Therapy for twice daily PT, and from the Hospitalist for Medical Co-management. Patient was placed on anticoagulation.  Pertinent home medications were continued.  Daily labs were followed.      On POD 0 pt was stable overnight. No major events post op. Pt received twice daily PT. The plan is for DC to home with home PT. The orthopedic Attending is aware and agrees.    **POD1 DC DOCUMENTAION** (Keep or Delete depending on scenario)  The patient had no post-operative complications and clinically progressed faster than anticipated. The following condition(s) were actively treated during the hospital stay:  Diabetes  HTN  BMI>=35  Cardiac/Cardiovascular disease (MI, CAD, HF, Stroke, Cardiac Arrhythmia)  Pulmonary disease  Sleep Apnea  End stage renal disease  Hx of cirrhosis  Depression, Anxiety, hx of mental illness  Hx of DVT/VTE  Chronic pain, requiring narcotics  The patient met criteria for sicharge before the 2nd night of the stay. The patient was appropriately and safely discharged home with home PT.    ******INCOMPLETE NOTE IN PREPARATION FOR DISPO PLANNING*******  ******INCOMPLETE NOTE IN PREPARATION FOR DISPO PLANNING*******  ******INCOMPLETE NOTE IN PREPARATION FOR DISPO PLANNING*******     H&P:  Pt is a83y Female     Now s/p Left Total Knee Arthroplasty. Pt is afebrile with stable vital signs. Pain is controlled. Alert and Oriented. Exam intact EHL FHL TA GS, +DP. Dressing is clean and dry.    Hospital Course:  Patient presented to [facility] medically cleared for elective Knee Replacement Surgery, having failed outpatient conservative management. Prophylactic antibiotics were started before the procedure and continued for 24 hours. Pt received an Adductor canal block in the OR for analgesia per anesthesia protocol. They were admitted after surgery to the orthopedic floor.  There were no complications during the hospital stay. All home medications were continued.     Routine consults were obtained from the Anticoagulation Team for DVT/PE prophylaxis, from Physical Therapy for twice daily PT, and from the Hospitalist for Medical Co-management. Patient was placed on anticoagulation.  Pertinent home medications were continued.  Daily labs were followed.      On POD 0 pt was stable overnight. No major events post op. Pt received twice daily PT. The plan is for DC to home with home PT. The orthopedic Attending is aware and agrees.    The patient had no post-operative complications and clinically progressed faster than anticipated. The following condition(s) were actively treated during the hospital stay:  Diabetes  HTN  BMI>=35  Cardiac/Cardiovascular disease (MI, CAD, HF, Stroke, Cardiac Arrhythmia)  Pulmonary disease  Sleep Apnea  End stage renal disease  Hx of cirrhosis  Depression, Anxiety, hx of mental illness  Hx of DVT/VTE  Chronic pain, requiring narcotics  The patient met criteria for sicharge before the 2nd night of the stay. The patient was appropriately and safely discharged home with home PT.

## 2023-03-24 NOTE — PHYSICAL THERAPY INITIAL EVALUATION ADULT - GENERAL OBSERVATIONS, REHAB EVAL
reclined on PACU stretcher with B FlowtronDREW patel ACE bandaged over L anterior knee dressing which is C/D/I ,reports mild incisional pain just beginning prior to my arrival ;+ IV fluid R forearm ,nasal O2 2L/min, monitors in place

## 2023-03-24 NOTE — DISCHARGE NOTE NURSING/CASE MANAGEMENT/SOCIAL WORK - PATIENT PORTAL LINK FT
You can access the FollowMyHealth Patient Portal offered by [facility] by registering at the following website: [email or web address]. By joining [employer]’s FollowMyHealth portal, you will also be able to view your health information using other applications (apps) compatible with our system.

## 2023-03-24 NOTE — PHYSICAL THERAPY INITIAL EVALUATION ADULT - PLANNED THERAPY INTERVENTIONS, PT EVAL
stair training, car transfers, pain/edema relieving modalities L TKR (ICING FREQUENTLY)/bed mobility training/gait training/ROM/strengthening/stretching/transfer training

## 2023-03-24 NOTE — CONSULT NOTE ADULT - ASSESSMENT
Patient is a 83y old  Female who presents with a chief complaint of left knee pain now s/p left total knee replacement  (24 Mar 2023 10:44).Consulted by Dr.Scott Loja   for VTE prophylaxis, risk stratification, and anticoagulation management. patient is high risk for VTE and low bleeding risk.  Discussed the risks vs benefits of  aspirin therapy with patient. Agrees with treatment and understands the necessity of therapy.    Plan:    Enteric coated ASA 81mg PO BID x 4 weeks last dose4/21/2023    Protonix 40mg PO daily while on Aspirin    Daily CBC/BMP    Venodynes    Enc ambulation    Thank you for this consult will sign off please reconsult if needed

## 2023-03-24 NOTE — DISCHARGE NOTE PROVIDER - NSDCMRMEDTOKEN_GEN_ALL_CORE_FT
acetaminophen 500 mg oral tablet: 2 tab(s) orally every 8 hours, As Needed -for mild pain MDD:6  atenolol 50 mg oral tablet: 1 tab(s) orally once a day (at bedtime)  atorvastatin 10 mg oral tablet: 1 tab(s) orally once a day  Microzide 12.5 mg oral capsule: 1 cap(s) orally once a day  MiraLax oral powder for reconstitution: 17 gram(s) orally once a day   oxyCODONE 5 mg oral tablet: 1 tab(s) orally every 4 hours, As Needed -for severe pain MDD:6  pantoprazole 40 mg oral delayed release tablet: 1 tab(s) orally once a day   senna oral tablet: 1 tab(s) orally once a day   valsartan 160 mg oral tablet: 1 tab(s) orally once a day  Vitamin D3 125 mcg (5000 intl units) oral capsule: 1 cap(s) orally once a day   acetaminophen 500 mg oral tablet: 2 tab(s) orally every 8 hours, As Needed -for mild pain MDD:6  Aspirin Enteric Coated 81 mg oral delayed release tablet: 1 tab(s) orally every 12 hours MDD:last dose 4/21/2023  atenolol 50 mg oral tablet: 1 tab(s) orally once a day (at bedtime)  atorvastatin 10 mg oral tablet: 1 tab(s) orally once a day  Microzide 12.5 mg oral capsule: 1 cap(s) orally once a day  MiraLax oral powder for reconstitution: 17 gram(s) orally once a day   oxyCODONE 5 mg oral tablet: 1 tab(s) orally every 4 hours, As Needed -for severe pain MDD:6  pantoprazole 40 mg oral delayed release tablet: 1 tab(s) orally once a day   senna oral tablet: 1 tab(s) orally once a day   valsartan 160 mg oral tablet: 1 tab(s) orally once a day  Vitamin D3 125 mcg (5000 intl units) oral capsule: 1 cap(s) orally once a day

## 2023-03-24 NOTE — CONSULT NOTE ADULT - SUBJECTIVE AND OBJECTIVE BOX
PCP:  Dr. 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  Pt is seen in PACU, awake, alert, no cp or sob, VSS in NAD, denies any CP or SOB.  We are consulted for medical management    **** Per pt had stated allergy to Oxycodone and tramadol is not a true allergy but intolerance 2/2 severe constipation*****      PAST MEDICAL HISTORY: as above    PAST SURGICAL HISTORY:  right TKR, cataract surgery. left hand surgery, left TSR    FAMILY HISTORY:  Mother: Dec: age 60 Brain hemorrhage, Father: dec age 74: MI    SOCIAL HISTORY:  never a smoker, daily 1-2 glasses of wine, no drugs    ALLERGIES: NKDA  Intolerances : tramadol, oxycodone causes severe constipation    HOME MEDS: see med rec    REVIEW OF SYSTEMS:   All 10 systems reviewed in detailed and found to be negative with the exception of what has already been described above    MEDICATIONS  (STANDING):  acetaminophen     Tablet .. 975 milliGRAM(s) Oral every 8 hours  acetaminophen   IVPB .. 1000 milliGRAM(s) IV Intermittent once  aspirin enteric coated 81 milliGRAM(s) Oral every 12 hours  atenolol  Tablet 50 milliGRAM(s) Oral at bedtime  atorvastatin 10 milliGRAM(s) Oral at bedtime  ceFAZolin   IVPB 2000 milliGRAM(s) IV Intermittent every 8 hours  ferrous    sulfate 325 milliGRAM(s) Oral daily  folic acid 1 milliGRAM(s) Oral daily  lactated ringers. 1000 milliLiter(s) (75 mL/Hr) IV Continuous <Continuous>  multivitamin 1 Tablet(s) Oral daily  polyethylene glycol 3350 17 Gram(s) Oral at bedtime  senna 2 Tablet(s) Oral at bedtime  tranexamic acid IVPB 1000 milliGRAM(s) IV Intermittent once  tranexamic acid IVPB 1000 milliGRAM(s) IV Intermittent once  valsartan 160 milliGRAM(s) Oral daily    MEDICATIONS  (PRN):  HYDROmorphone  Injectable 0.5 milliGRAM(s) IV Push every 10 minutes PRN Moderate Pain (4 - 6)  HYDROmorphone  Injectable 0.5 milliGRAM(s) SubCutaneous every 4 hours PRN Severe Pain (7 - 10)  magnesium hydroxide Suspension 30 milliLiter(s) Oral daily PRN Constipation  ondansetron Injectable 4 milliGRAM(s) IV Push every 6 hours PRN Nausea and/or Vomiting  ondansetron Injectable 4 milliGRAM(s) IV Push once PRN Nausea and/or Vomiting  oxyCODONE    IR 10 milliGRAM(s) Oral every 3 hours PRN Severe Pain (7 - 10)  oxyCODONE    IR 5 milliGRAM(s) Oral every 3 hours PRN Mild Pain (1 - 3)  traMADol 25 milliGRAM(s) Oral every 6 hours PRN Moderate Pain (4 - 6)      VITALS:  T(F): 97.3 (03-24-23 @ 14:45), Max: 97.6 (03-24-23 @ 10:03)  HR: 66 (03-24-23 @ 15:30) (66 - 77)  BP: 114/46 (03-24-23 @ 15:30) (92/44 - 133/81)  RR: 17 (03-24-23 @ 15:30) (12 - 22)  SpO2: 100% (03-24-23 @ 15:30) (98% - 100%)  Wt(kg): --    I&O's Summary    24 Mar 2023 07:01  -  24 Mar 2023 16:11  --------------------------------------------------------  IN: 750 mL / OUT: 0 mL / NET: 750 mL        CAPILLARY BLOOD GLUCOSE  POCT Blood Glucose.: 104 mg/dL (24 Mar 2023 13:44)  POCT Blood Glucose.: 112 mg/dL (24 Mar 2023 10:01)    PHYSICAL EXAM:    GENERAL: Comfortable, no acute distress   HEAD:  Normocephalic, atraumatic  EYES: EOMI, PERRLA  HEENT: Moist mucous membranes  NECK: Supple, No JVD  NERVOUS SYSTEM:  Alert & Oriented X3, Motor Strength 5/5 B/L upper and lower extremities  CHEST/LUNG: Clear to auscultation bilaterally  HEART: Regular rate and rhythm  ABDOMEN: Soft, non tender, Nondistended, Bowel sounds present  GENITOURINARY: Voiding, no palpable bladder  EXTREMITIES:   No clubbing, cyanosis, or edema  MUSCULOSKELETAL- No muscle tenderness, no joint tenderness  SKIN-no rash      LABS:                      11.1   9.97  )-----------( 397      ( 24 Mar 2023 13:59 )             34.5     03-24    140  |  109<H>  |  21  ----------------------------<  126<H>  4.0   |  27  |  0.86    Ca    9.0      24 Mar 2023 13:59      IMPRESSION: 84 yo female with above pmh a/w:  # OA left knee  # S/P left TKR    POD#0  pain control  PT eval  Bowel regimen  IVF's  Finish ABXs  regular diet  PPI  VTE prophylaxis  monitor CBC/BMP      # HTN  cont bb  pt at high risk for fluctuations in B/P 2/2 anesthesia, pain, hypovolemia and use of narcotics, placing pt at high risk for MI/CVA  monitor B/P closely  adjust anti-htn's accordingly    # HLD  cont statin    #CKD2  baseline creat 0.80  at baseline    # Vte prophylaxis  AC consult  AppsFunder  INC mobility      Thank you for the consult, will follow

## 2023-03-24 NOTE — PHYSICAL THERAPY INITIAL EVALUATION ADULT - PERTINENT HX OF CURRENT PROBLEM, REHAB EVAL
OA/DJD L knee for elective replacement; pt had R TKR 9 years ago with Dr Loja, but suffered a trip/fall during the recovery process with ?loosening of prosthesis necessitating revision surgery 9 months later .Pt has also had bilateral shoulder surgeries with Dr Loja

## 2023-03-24 NOTE — PHYSICAL THERAPY INITIAL EVALUATION ADULT - LEVEL OF INDEPENDENCE: SIT/STAND, REHAB EVAL
on initially standing had transient episode of L knee wobbliness ,improved with standing in place, gentle weight shifting L/R at RW , adequate use of BUEs at RW for load-sharing/contact guard/minimum assist (75% patients effort)

## 2023-03-24 NOTE — DISCHARGE NOTE PROVIDER - NSDCFUSCHEDAPPT_GEN_ALL_CORE_FT
[facility]  [address] [facility]  Scheduled Appointment: 05/01/2023

## 2023-03-24 NOTE — PHYSICAL THERAPY INITIAL EVALUATION ADULT - LEVEL OF INDEPENDENCE: SUPINE/SIT, REHAB EVAL
dangled several mins as reported mild "wooziness' on assuming vertical posture /dangling at EOB/contact guard

## 2023-03-24 NOTE — PHYSICAL THERAPY INITIAL EVALUATION ADULT - NSACTIVITYREC_GEN_A_PT
out of bed to chair ,bathroom with RW/1PA (pt is petite at 5ft tall) progressive ambulation with PT/RW FWBAT L ,progress to stairs,car transfers prior to DC , ICE frequently for pain/edema L knee

## 2023-03-24 NOTE — PHYSICAL THERAPY INITIAL EVALUATION ADULT - ACTIVE RANGE OF MOTION EXAMINATION, REHAB EVAL
L TKR 0- >100degrees flexion/edu. upper extremity Active ROM was WNL (within normal limits)/bilateral upper extremity Active ROM was WFL (within functional limits)

## 2023-03-24 NOTE — PHYSICAL THERAPY INITIAL EVALUATION ADULT - NSPTDMEREC_GEN_A_CORE
pt should have DME from prior surgical stays however insure in good working order (R TKR 9 years ago) Cheek To Nose Interpolation Flap Division And Inset Text: Division and inset of the cheek to nose interpolation flap was performed to achieve optimal aesthetic result, restore normal anatomic appearance and avoid distortion of normal anatomy, expedite and facilitate wound healing, achieve optimal functional result and because linear closure either not possible or would produce suboptimal result. The patient was prepped and draped in the usual manner. The pedicle was infiltrated with local anesthesia. The pedicle was sectioned with a #15 blade. The pedicle was de-bulked and trimmed to match the shape of the defect. Hemostasis was achieved. The flap donor site and free margin of the flap were secured with deep buried sutures and the wound edges were re-approximated.

## 2023-03-24 NOTE — DISCHARGE NOTE NURSING/CASE MANAGEMENT/SOCIAL WORK - NSDCPEFALRISK_GEN_ALL_CORE
For information on Fall & Injury Prevention, visit: [email or web address] OR  [email or web address] OR  https://www.cdc.gov/steadi/patient.html

## 2023-03-24 NOTE — DISCHARGE NOTE PROVIDER - DISCHARGE DATE
I have seen her a couple times for this and she has been given Diflucan  Her last Vaginal swab was negative  I would refer to GYN   Thanks 25-Mar-2023

## 2023-03-24 NOTE — CONSULT NOTE ADULT - SUBJECTIVE AND OBJECTIVE BOX
HPI:      Patient is a 83y old  Female who presents with a chief complaint of left knee pain now s/p left total knee replacement  (24 Mar 2023 10:44)      Consulted by Dr.Scott Loja   for VTE prophylaxis, risk stratification, and anticoagulation management.    PAST MEDICAL & SURGICAL HISTORY:  HTN (hypertension)      Obesity      Hypercholesterolemia      OA (osteoarthritis) of shoulder      Spondylolisthesis of lumbar region      Seasonal allergies      Restless legs syndrome (RLS)      Heart murmur      Post poliomyelitis syndrome      Polio      Spondylolisthesis      S/P appendectomy        History of cataract surgery  b/l 2006      H/O total knee replacement, right        History of revision of total knee arthroplasty  right knee       History of hand surgery  left hand 2016      S/P shoulder replacement, right  3/2019      History of left shoulder replacement    Interval history    3/24/2023: Patient seen at bedside , discussed the necessity of DVT prophylaxis post procedure, patient denies any h/o vte, stroke, or cancer , advised patient to take ASA 81mg BID with food for 4 weeks , advised patient to refrain from NSAIDs while on ASA , patient in agreement with the plan       CrCl:60.2  BMI:33.2  EBL:150ml    Caprini VTE Risk Score:  AGE RELATED RISK FACTORS                                                       MOBILITY RELATED FACTORS  [ ] Age 41-60 years                                            (1 Point)                  [ ] Bed rest /restricted mobility                             (1 Point)  [ ] Age: 61-74 years                                           (2 Points)                [ ] Plaster cast                                                   (2 Points)  [x ] Age= 75 years                                              (3 Points)                 [ ] Bed bound for more than 72 hours                   (2 Points)    DISEASE RELATED RISK FACTORS                                               GENDER SPECIFIC FACTORS  [ ] Edema in the lower extremities                       (1 Point)           [ ] Pregnancy                                                            (1 Point)  [ ] Varicose veins                                               (1 Point)                  [ ] Post-partum < 6 weeks                                      (1 Point)             [x ] BMI > 25 Kg/m2                                            (1 Point)                  [ ] Hormonal therapy or oral contraception       (1 Point)                 [ ] Sepsis (in the previous month)                        (1 Point)             [ ] History of pregnancy complications                (1Point)  [ ] Pneumonia or serious lung disease                                             [ ] Unexplained or recurrent  (=/>3), premature                                 (In the previous month)                               (1 Point)                birth with toxemia or growth-restricted infant (1 Point)  [ ] Abnormal pulmonary function test            (1 Point)                                   SURGERY RELATED RISK FACTORS  [ ] Acute myocardial infarction                       (1 Point)                  [ ]  Section                                         (1 Point)  [ ] Congestive heart failure (in the previous month) (1 Point)   [ ] Minor surgery   lasting <45 minutes       (1 Point)   [ ] Inflammatory bowel disease                             (1 Point)          [ ] Arthroscopic surgery                                  (2 Points)  [ ] Central venous access                                    (2 Points)            [ ] General surgery lasting >45 minutes      (2 Points)       [ ] Stroke (in the previous month)                  (5 Points)            [ x] Elective major lower extremity arthroplasty (5 Points)                                   [  ] Malignancy (present or past include skin melanoma                                          but exclude  basal skin cell)    (2 points)                                      TRAUMA RELATED RISK FACTORS                HEMATOLOGY RELATED FACTORS                                  [ ] Fracture of the hip, pelvis, or leg                       (5 Points)  [ ] Prior episodes of VTE                                     (3 Points)          [ ] Acute spinal cord injury (in the previous month)  (5 Points)  [ ] Positive family history for VTE                         (3 Points)       [ ] Paralysis (less than 1 month)                          (5 Points)  [ ] Prothrombin 76171 A                                      (3 Points)         [ ] Multiple Trauma (within 1month)                 (5Points)                                                                                                                                                                [ ] Factor V Leiden                                          (3 Points)                                OTHER RISK FACTORS                          [ ] Lupus anticoagulants                                     (3 Points)                       [ ] BMI > 40                          (1 Point)                                                         [ ] Anticardiolipin antibodies                                (3 Points)                   [ ] Smoking                              (1Point)                                                [ ] High homocysteine in the blood                      (3 Points)                [  ] Diabetes requiring insulin (1point)                         [ ] Other congenital or acquired thrombophilia       (3 Points)          [  ] Chemotherapy                   (1 Point)  [ ] Heparin induced thrombocytopenia                  (3 Points)             [  ] Blood Transfusion                (1 point)                                                                                                             Total Score [ 9         ]                                                                                                                                                                                                                                                                                                                                          IMPROVE Bleeding Risk Score:1.5      Falls Risk:   High (x  )  Mod (  )  Low (  )      FAMILY HISTORY:  Family history of heart disease (Father, Mother)    Family history of cerebrovascular accident (CVA) in mother (Mother)      Denies any personal or familial history of clotting or bleeding disorders.    Allergies    No Known Allergies    Intolerances    oxycodone (Nausea; Stomach Upset)  tramadol (Stomach Upset; Vomiting; Nausea)      REVIEW OF SYSTEMS    (  )Fever	     (  )Constipation	(  )SOB				(  )Headache	(  )Dysuria  (  )Chills	     (  )Melena	(  )Dyspnea present on exertion	                    (  )Dizziness                    (  )Polyuria  (  )Nausea	     (  )Hematochezia	(  )Cough			                    (  )Syncope   	(  )Hematuria  (  )Vomiting    (  )Chest Pain	(  )Wheezing			(  )Weakness  (  )Diarrhea     (  )Palpitations	(  )Anorexia			( x )joint pain    All  other review of systems negative: Yes    Vital Signs Last 24 Hrs  T(C): 36.3 (24 Mar 2023 14:45), Max: 36.4 (24 Mar 2023 10:03)  T(F): 97.3 (24 Mar 2023 14:45), Max: 97.6 (24 Mar 2023 10:03)  HR: 75 (24 Mar 2023 15:15) (68 - 77)  BP: 99/54 (24 Mar 2023 15:15) (92/44 - 133/81)  BP(mean): --  RR: 13 (24 Mar 2023 15:15) (12 - 22)  SpO2: 100% (24 Mar 2023 15:15) (98% - 100%)    PHYSICAL EXAM:    Constitutional: Appears Well    Neurological: A& O x 3    Skin: Warm    Respiratory and Thorax: normal effort; Breath sounds: normal; No rales/wheezing/rhonchi  	  Cardiovascular: S1, S2, regular, NMBR	    Gastrointestinal: BS + x 4Q, nontender	    Genitourinary:  Bladder nondistended, nontender    Musculoskeletal:   General Right:   no muscle/joint tenderness,   normal tone, no joint swelling,   ROM: full	    General Left:   + muscle/joint tenderness,   normal tone, no joint swelling,   ROM: limited          Knee:     Left: Incision: ; Dressing CDI;       Lower extrems:   Right: no calf tenderness              negative tru's sign               + pedal pulses    Left:   no calf tenderness              negative tru's sign               + pedal pulses                          11.1   9.97  )-----------( 397      ( 24 Mar 2023 13:59 )             34.5       03-24    140  |  109<H>  |  21  ----------------------------<  126<H>  4.0   |  27  |  0.86    Ca    9.0      24 Mar 2023 13:59        MEDICATIONS  (STANDING):  acetaminophen   IVPB .. 1000 milliGRAM(s) IV Intermittent once  aspirin enteric coated 81 milliGRAM(s) Oral every 12 hours  atenolol  Tablet 50 milliGRAM(s) Oral at bedtime  atorvastatin 10 milliGRAM(s) Oral at bedtime  hydrochlorothiazide 12.5 milliGRAM(s) Oral daily  lactated ringers. 1000 milliLiter(s) (75 mL/Hr) IV Continuous <Continuous>  tranexamic acid IVPB 1000 milliGRAM(s) IV Intermittent once  tranexamic acid IVPB 1000 milliGRAM(s) IV Intermittent once  valsartan 160 milliGRAM(s) Oral daily        DVT Prophylaxis:  LMWH                   (  )  Heparin SQ           (  )  Coumadin             (  )  Xarelto                  (  )  Eliquis                   (  )  Venodynes           (x  )  Ambulation          (x  )  UFH                       (  )  Contraindicated  (  )  EC ASPIRIN       (x  )

## 2023-03-24 NOTE — PHYSICAL THERAPY INITIAL EVALUATION ADULT - SKIN INTEGRITY
well healed R TKR surgical scar; L knee dressing C/D/I covered with ACE bandaging mid-thigh to lower leg/scar/surgical incision

## 2023-03-24 NOTE — CONSULT NOTE ADULT - NS ATTEND AMEND GEN_ALL_CORE FT
Pt seen and examined. DW NP Emily Luna. Agree with documentation as above with changes made where appropriate.   83F, pmh of HTN, HLD, CKD II, RLS, LLE weakness from polio, OA who is admitted for elective left knee replacement.   -pain control  -physical therapy  -incentive spirometry  -continue bb with hold parameters  -statin for HLD  -dvt px per a/c services.     thanks, will follow

## 2023-03-24 NOTE — DISCHARGE NOTE PROVIDER - NSDCFUADDINST_GEN_ALL_CORE_FT
Discharge Instructions for Left Total Knee Arthroplasty:    1. ACTIVITY: WBAT, Rolling walker, Daily PT. Gentle ROM 0-full as tolerated. Walk plenty.  Knee Immobilizer at night time only for 3 weeks.  2. CALL FOR: fever over 101, wound redness, drainage or open area, calf pain/calf swelling.  3. BANDAGE: Change dressing to a new Mepilex Ag bandage POD7 (3/31/23). May change sooner if dressing saturated or falling off. DO NOT REMOVE BANDAGE TO CHECK WOUND ON INTAKE.  4. SHOWER: ok to shower, do not submerge, no baths, no hot tubs  5. STAPLES: RN Remove Staples POD14 (4/7/23).  6. DVT PE PROPHYLAXIS: Managed by Anticoag Team. See Med Rec.  7. GI: Continue Protonix daily while on Anticoagulant. an eRx has been sent to your pharmacy.  8. LABS:  INR if on Coumadin.  9. FOLLOW UP: Dr. Loja in 1 month. Call to schedule.  10. MEDICATIONS:  If going home, eRX sent to your pharmacy for .   11.**Call office if medications not covered under your insurance, especially BLOOD CLOT PREVENTION/anticoagulant medication.

## 2023-03-25 LAB
ANION GAP SERPL CALC-SCNC: 5 MMOL/L — SIGNIFICANT CHANGE UP (ref 5–17)
BUN SERPL-MCNC: 22 MG/DL — SIGNIFICANT CHANGE UP (ref 7–23)
CALCIUM SERPL-MCNC: 9.4 MG/DL — SIGNIFICANT CHANGE UP (ref 8.5–10.1)
CHLORIDE SERPL-SCNC: 107 MMOL/L — SIGNIFICANT CHANGE UP (ref 96–108)
CO2 SERPL-SCNC: 25 MMOL/L — SIGNIFICANT CHANGE UP (ref 22–31)
CREAT SERPL-MCNC: 0.8 MG/DL — SIGNIFICANT CHANGE UP (ref 0.5–1.3)
EGFR: 73 ML/MIN/1.73M2 — SIGNIFICANT CHANGE UP
GLUCOSE BLDC GLUCOMTR-MCNC: 170 MG/DL — HIGH (ref 70–99)
GLUCOSE SERPL-MCNC: 137 MG/DL — HIGH (ref 70–99)
HCT VFR BLD CALC: 32.1 % — LOW (ref 34.5–45)
HGB BLD-MCNC: 10.4 G/DL — LOW (ref 11.5–15.5)
MCHC RBC-ENTMCNC: 25.4 PG — LOW (ref 27–34)
MCHC RBC-ENTMCNC: 32.4 GM/DL — SIGNIFICANT CHANGE UP (ref 32–36)
MCV RBC AUTO: 78.3 FL — LOW (ref 80–100)
PLATELET # BLD AUTO: 329 K/UL — SIGNIFICANT CHANGE UP (ref 150–400)
POTASSIUM SERPL-MCNC: 3.7 MMOL/L — SIGNIFICANT CHANGE UP (ref 3.5–5.3)
POTASSIUM SERPL-SCNC: 3.7 MMOL/L — SIGNIFICANT CHANGE UP (ref 3.5–5.3)
RBC # BLD: 4.1 M/UL — SIGNIFICANT CHANGE UP (ref 3.8–5.2)
RBC # FLD: 17.1 % — HIGH (ref 10.3–14.5)
SODIUM SERPL-SCNC: 137 MMOL/L — SIGNIFICANT CHANGE UP (ref 135–145)
WBC # BLD: 13.99 K/UL — HIGH (ref 3.8–10.5)
WBC # FLD AUTO: 13.99 K/UL — HIGH (ref 3.8–10.5)

## 2023-03-25 PROCEDURE — 99232 SBSQ HOSP IP/OBS MODERATE 35: CPT

## 2023-03-25 RX ORDER — POLYETHYLENE GLYCOL 3350 17 G/17G
17 POWDER, FOR SOLUTION ORAL ONCE
Refills: 0 | Status: DISCONTINUED | OUTPATIENT
Start: 2023-03-25 | End: 2023-03-25

## 2023-03-25 RX ORDER — TRAMADOL HYDROCHLORIDE 50 MG/1
1 TABLET ORAL
Qty: 40 | Refills: 0
Start: 2023-03-25 | End: 2023-04-03

## 2023-03-25 RX ADMIN — CELECOXIB 200 MILLIGRAM(S): 200 CAPSULE ORAL at 10:13

## 2023-03-25 RX ADMIN — VALSARTAN 160 MILLIGRAM(S): 80 TABLET ORAL at 10:13

## 2023-03-25 RX ADMIN — Medication 81 MILLIGRAM(S): at 10:12

## 2023-03-25 RX ADMIN — CELECOXIB 200 MILLIGRAM(S): 200 CAPSULE ORAL at 10:12

## 2023-03-25 NOTE — PROGRESS NOTE ADULT - SUBJECTIVE AND OBJECTIVE BOX
PCP:  Dr. Higgins    C/C: left knee OA    HPI:  83F, pmh of HTN, HLD, Spinal stenosis, CKD II, RLS, LLE weakness from polio, OA who is admitted for left knee replacement after failing conservative measures. Hospitalist service consulted for medical comanagement.   pt seen and examined this am. Doing well. Ambulating with physical therapy in hallway. Was c/o pain behind the left knee. no sob/chest pain. Tolerating po. Voiding freely.     REVIEW OF SYSTEMS:   All 10 systems reviewed in detailed and found to be negative with the exception of what has already been described above    VSS as charted    PHYSICAL EXAM:    GENERAL: Comfortable, no acute distress   HEAD:  Normocephalic, atraumatic  EYES: EOMI, PERRLA  HEENT: Moist mucous membranes  NECK: Supple, No JVD  NERVOUS SYSTEM:  Alert & Oriented X3, Motor Strength 5/5 B/L upper and lower extremities  CHEST/LUNG: Clear to auscultation bilaterally  HEART: Regular rate and rhythm  ABDOMEN: Soft, non tender, Nondistended, Bowel sounds present  GENITOURINARY: Voiding, no palpable bladder  EXTREMITIES:   No clubbing, cyanosis, or edema  MUSCULOSKELETAL- No muscle tenderness, no joint tenderness  SKIN-no rash    LABS:                        10.4   13.99 )-----------( 329      ( 25 Mar 2023 08:14 )             32.1     03-25    137  |  107  |  22  ----------------------------<  137<H>  3.7   |  25  |  0.80    Ca    9.4      25 Mar 2023 08:14      MEDICATIONS  (STANDING):  acetaminophen     Tablet .. 975 milliGRAM(s) Oral every 8 hours  aspirin enteric coated 81 milliGRAM(s) Oral every 12 hours  atenolol  Tablet 50 milliGRAM(s) Oral at bedtime  atorvastatin 10 milliGRAM(s) Oral at bedtime  celecoxib 200 milliGRAM(s) Oral every 12 hours  dexAMETHasone  IVPB 8 milliGRAM(s) IV Intermittent once  ferrous    sulfate 325 milliGRAM(s) Oral daily  folic acid 1 milliGRAM(s) Oral daily  lactated ringers. 1000 milliLiter(s) (75 mL/Hr) IV Continuous <Continuous>  multivitamin 1 Tablet(s) Oral daily  polyethylene glycol 3350 17 Gram(s) Oral once  polyethylene glycol 3350 17 Gram(s) Oral at bedtime  senna 2 Tablet(s) Oral at bedtime  tranexamic acid IVPB 1000 milliGRAM(s) IV Intermittent once  tranexamic acid IVPB 1000 milliGRAM(s) IV Intermittent once  valsartan 160 milliGRAM(s) Oral daily    MEDICATIONS  (PRN):  HYDROmorphone  Injectable 0.5 milliGRAM(s) SubCutaneous every 4 hours PRN Severe Pain (7 - 10)  magnesium hydroxide Suspension 30 milliLiter(s) Oral daily PRN Constipation  ondansetron Injectable 4 milliGRAM(s) IV Push every 6 hours PRN Nausea and/or Vomiting  oxyCODONE    IR 10 milliGRAM(s) Oral every 3 hours PRN Severe Pain (7 - 10)  oxyCODONE    IR 5 milliGRAM(s) Oral every 3 hours PRN Mild Pain (1 - 3)  traMADol 25 milliGRAM(s) Oral every 6 hours PRN Moderate Pain (4 - 6)      ASSESSMENT AND PLAN:   82 yo female with above pmh a/w:    # OA left knee  # S/P left TKR  -Pain control  -physical therapy  -incentive spirometry  -bowel regimen.     #Acute blood loss anemia:  -d/t surgery  -no need for transfusion at this time.     # HTN  -continue bb with hold parameters    # HLD  cont statin    #CKD2  -monitor renal funciton.     # Vte prophylaxis  Enteric coated ASA 81mg PO BID x 4 weeks last dose4/21/2023 per a/c services             PCP:  Dr. Higgins    C/C: left knee OA    HPI:  83F, pmh of HTN, HLD, Spinal stenosis, CKD II, RLS, LLE weakness from polio, OA who is admitted for left knee replacement after failing conservative measures. Hospitalist service consulted for medical comanagement.   pt seen and examined this am. Doing well. Ambulating with physical therapy in hallway. Was c/o pain behind the left knee. no sob/chest pain. Tolerating po. Voiding freely.     REVIEW OF SYSTEMS:   All 10 systems reviewed in detailed and found to be negative with the exception of what has already been described above    VSS as charted    PHYSICAL EXAM:    GENERAL: Comfortable, no acute distress   HEAD:  Normocephalic, atraumatic  EYES: EOMI, PERRLA  HEENT: Moist mucous membranes  NECK: Supple, No JVD  NERVOUS SYSTEM:  Alert & Oriented X3, Motor Strength 5/5 B/L upper and lower extremities  CHEST/LUNG: Clear to auscultation bilaterally  HEART: Regular rate and rhythm  ABDOMEN: Soft, non tender, Nondistended, Bowel sounds present  GENITOURINARY: Voiding, no palpable bladder  EXTREMITIES:   No clubbing, cyanosis, or edema  MUSCULOSKELETAL- No muscle tenderness, no joint tenderness  SKIN-no rash    LABS:                        10.4   13.99 )-----------( 329      ( 25 Mar 2023 08:14 )             32.1     03-25    137  |  107  |  22  ----------------------------<  137<H>  3.7   |  25  |  0.80    Ca    9.4      25 Mar 2023 08:14      MEDICATIONS  (STANDING):  acetaminophen     Tablet .. 975 milliGRAM(s) Oral every 8 hours  aspirin enteric coated 81 milliGRAM(s) Oral every 12 hours  atenolol  Tablet 50 milliGRAM(s) Oral at bedtime  atorvastatin 10 milliGRAM(s) Oral at bedtime  celecoxib 200 milliGRAM(s) Oral every 12 hours  dexAMETHasone  IVPB 8 milliGRAM(s) IV Intermittent once  ferrous    sulfate 325 milliGRAM(s) Oral daily  folic acid 1 milliGRAM(s) Oral daily  lactated ringers. 1000 milliLiter(s) (75 mL/Hr) IV Continuous <Continuous>  multivitamin 1 Tablet(s) Oral daily  polyethylene glycol 3350 17 Gram(s) Oral once  polyethylene glycol 3350 17 Gram(s) Oral at bedtime  senna 2 Tablet(s) Oral at bedtime  tranexamic acid IVPB 1000 milliGRAM(s) IV Intermittent once  tranexamic acid IVPB 1000 milliGRAM(s) IV Intermittent once  valsartan 160 milliGRAM(s) Oral daily    MEDICATIONS  (PRN):  HYDROmorphone  Injectable 0.5 milliGRAM(s) SubCutaneous every 4 hours PRN Severe Pain (7 - 10)  magnesium hydroxide Suspension 30 milliLiter(s) Oral daily PRN Constipation  ondansetron Injectable 4 milliGRAM(s) IV Push every 6 hours PRN Nausea and/or Vomiting  oxyCODONE    IR 10 milliGRAM(s) Oral every 3 hours PRN Severe Pain (7 - 10)  oxyCODONE    IR 5 milliGRAM(s) Oral every 3 hours PRN Mild Pain (1 - 3)  traMADol 25 milliGRAM(s) Oral every 6 hours PRN Moderate Pain (4 - 6)      ASSESSMENT AND PLAN:   82 yo female with above pmh a/w:    # OA left knee  # S/P left TKR  -Pain control  -physical therapy  -incentive spirometry  -bowel regimen.     #Acute blood loss anemia:  -d/t surgery  -no need for transfusion at this time.     # HTN  -continue bb/arb with hold parameters    # HLD  cont statin    #CKD2  -monitor renal funciton.     # Vte prophylaxis  Enteric coated ASA 81mg PO BID x 4 weeks last dose4/21/2023 per a/c services

## 2023-03-26 ENCOUNTER — TRANSCRIPTION ENCOUNTER (OUTPATIENT)
Age: 83
End: 2023-03-26

## 2023-03-28 ENCOUNTER — TRANSCRIPTION ENCOUNTER (OUTPATIENT)
Age: 83
End: 2023-03-28

## 2023-03-29 DIAGNOSIS — M43.10 SPONDYLOLISTHESIS, SITE UNSPECIFIED: ICD-10-CM

## 2023-03-29 DIAGNOSIS — Z86.12 PERSONAL HISTORY OF POLIOMYELITIS: ICD-10-CM

## 2023-03-29 DIAGNOSIS — R53.1 WEAKNESS: ICD-10-CM

## 2023-03-29 DIAGNOSIS — E78.5 HYPERLIPIDEMIA, UNSPECIFIED: ICD-10-CM

## 2023-03-29 DIAGNOSIS — G25.81 RESTLESS LEGS SYNDROME: ICD-10-CM

## 2023-03-29 DIAGNOSIS — N18.2 CHRONIC KIDNEY DISEASE, STAGE 2 (MILD): ICD-10-CM

## 2023-03-29 DIAGNOSIS — I12.9 HYPERTENSIVE CHRONIC KIDNEY DISEASE WITH STAGE 1 THROUGH STAGE 4 CHRONIC KIDNEY DISEASE, OR UNSPECIFIED CHRONIC KIDNEY DISEASE: ICD-10-CM

## 2023-03-29 DIAGNOSIS — D62 ACUTE POSTHEMORRHAGIC ANEMIA: ICD-10-CM

## 2023-03-29 DIAGNOSIS — R01.1 CARDIAC MURMUR, UNSPECIFIED: ICD-10-CM

## 2023-03-29 DIAGNOSIS — M17.12 UNILATERAL PRIMARY OSTEOARTHRITIS, LEFT KNEE: ICD-10-CM

## 2023-03-29 DIAGNOSIS — M48.00 SPINAL STENOSIS, SITE UNSPECIFIED: ICD-10-CM

## 2023-03-29 LAB — SURGICAL PATHOLOGY STUDY: SIGNIFICANT CHANGE UP

## 2023-04-21 NOTE — PHYSICAL THERAPY INITIAL EVALUATION ADULT - LEVEL OF INDEPENDENCE: STAND/SIT, REHAB EVAL
Next Visit:  6/29/23  Last visit:   12/20/22    tirzepatide (Mounjaro) 5 MG/0.5ML Solution Pen-injector 2 mL 0 3/24/2023     Sig - Route: Inject 5 mg into the skin every 7 days. For 4 weeks       Per telephone encounter 3/24/23:  She is aware per dr hartman she can increase mounjaro to 5 mg weekly for the next 4 weeks.  [facility]    Please review and approve if appropriate for patient to continue on this dose.   [id number], returned to sitting on stretcher in lowest position/contact guard

## 2023-05-01 ENCOUNTER — APPOINTMENT (OUTPATIENT)
Dept: ORTHOPEDIC SURGERY | Facility: CLINIC | Age: 83
End: 2023-05-01
Payer: MEDICARE

## 2023-05-01 VITALS — WEIGHT: 160 LBS | BODY MASS INDEX: 31.41 KG/M2 | HEIGHT: 60 IN

## 2023-05-01 DIAGNOSIS — I10 ESSENTIAL (PRIMARY) HYPERTENSION: ICD-10-CM

## 2023-05-01 DIAGNOSIS — M17.12 UNILATERAL PRIMARY OSTEOARTHRITIS, LEFT KNEE: ICD-10-CM

## 2023-05-01 DIAGNOSIS — E78.00 PURE HYPERCHOLESTEROLEMIA, UNSPECIFIED: ICD-10-CM

## 2023-05-01 PROCEDURE — 73564 X-RAY EXAM KNEE 4 OR MORE: CPT | Mod: LT

## 2023-05-01 PROCEDURE — 99024 POSTOP FOLLOW-UP VISIT: CPT

## 2023-05-02 PROBLEM — M17.12 OSTEOARTHRITIS OF LEFT KNEE: Status: ACTIVE | Noted: 2023-01-30

## 2023-05-02 NOTE — DISCUSSION/SUMMARY
[de-identified] : Plan at this time is physical therapy and I will see her back in the office in 6 to 8 weeks.

## 2023-05-02 NOTE — HISTORY OF PRESENT ILLNESS
[2] : 2 [0] : 0 [Dull/Aching] : dull/aching [Localized] : localized [Retired] : Work status: retired [] : Post Surgical Visit: no [FreeTextEntry1] : L Knee [FreeTextEntry3] : 3/24/23 [FreeTextEntry5] : 82 y/o RHD F eval L Knee s/p L TKA on above DOS. 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

## 2023-05-02 NOTE — IMAGING
[de-identified] : Examination left lower extremity feels normal neurovascular exam.  Examination left knee reveals range of motion 0 to 115 degrees with a well-healed midline scar.  There is no signs of infection or DVT.  There is no effusion.  There is minimal joint line tenderness with no instability or apprehension.

## 2023-05-02 NOTE — ASSESSMENT
[FreeTextEntry1] : Patient comes in today follow-up on her left knee.  She is now 5 and half weeks out from her left total knee replacement on March 18, 2023.  She is doing beautifully.  She is done well with her physical therapy.  She has minimal complaints of pain.  She denies any fevers.

## 2023-05-23 ENCOUNTER — TRANSCRIPTION ENCOUNTER (OUTPATIENT)
Age: 83
End: 2023-05-23

## 2023-09-28 NOTE — ED ADULT TRIAGE NOTE - BSA (M2)
Please have patient come in to discuss diabetes meds as her Rybelsus was denied. So we need to talk about injections with her. She can probably see Dr. Gordon. 1.72

## 2024-11-07 NOTE — INPATIENT CERTIFICATION FOR MEDICARE PATIENTS - PHYSICIAN CONCUR
I concur with the Admission Order and I certify that services are provided in accordance with Section 42 CFR § 412.3 Her/She